# Patient Record
Sex: FEMALE | Race: BLACK OR AFRICAN AMERICAN | Employment: OTHER | URBAN - METROPOLITAN AREA
[De-identification: names, ages, dates, MRNs, and addresses within clinical notes are randomized per-mention and may not be internally consistent; named-entity substitution may affect disease eponyms.]

---

## 2017-06-27 ENCOUNTER — OFFICE VISIT (OUTPATIENT)
Dept: ORTHOPEDIC SURGERY | Age: 82
End: 2017-06-27

## 2017-06-27 VITALS
TEMPERATURE: 97.4 F | SYSTOLIC BLOOD PRESSURE: 133 MMHG | HEART RATE: 87 BPM | BODY MASS INDEX: 30.83 KG/M2 | RESPIRATION RATE: 14 BRPM | WEIGHT: 174 LBS | DIASTOLIC BLOOD PRESSURE: 71 MMHG | HEIGHT: 63 IN

## 2017-06-27 DIAGNOSIS — M54.50 PAIN OF LUMBAR SPINE: Primary | ICD-10-CM

## 2017-06-27 DIAGNOSIS — M46.1 SACROILIITIS (HCC): ICD-10-CM

## 2017-06-27 RX ORDER — OMEPRAZOLE 20 MG/1
20 CAPSULE, DELAYED RELEASE ORAL
COMMUNITY

## 2017-06-27 RX ORDER — COLCHICINE 0.6 MG/1
0.6 TABLET ORAL
COMMUNITY

## 2017-06-27 RX ORDER — MULTIVITAMIN
1 TABLET ORAL 2 TIMES DAILY
COMMUNITY

## 2017-06-27 RX ORDER — SIMVASTATIN 20 MG/1
TABLET, FILM COATED ORAL
COMMUNITY

## 2017-06-27 RX ORDER — ALLOPURINOL 100 MG/1
TABLET ORAL DAILY
COMMUNITY

## 2017-06-27 RX ORDER — CHOLECALCIFEROL (VITAMIN D3) 125 MCG
CAPSULE ORAL
COMMUNITY

## 2017-06-27 RX ORDER — GLIMEPIRIDE 2 MG/1
TABLET ORAL
COMMUNITY

## 2017-06-27 RX ORDER — AMLODIPINE BESYLATE 5 MG/1
5 TABLET ORAL DAILY
COMMUNITY

## 2017-06-27 RX ORDER — LISINOPRIL 20 MG/1
TABLET ORAL DAILY
COMMUNITY

## 2017-06-27 NOTE — MR AVS SNAPSHOT
Visit Information Date & Time Provider Department Dept. Phone Encounter #  
 6/27/2017 11:00 AM Leslie Allan MD [address] [facility] [phone] [id number] Follow-up Instructions Return in about 3 weeks (around 7/18/2017), or if symptoms worsen or fail to improve. Routing History Your Appointments 8/8/2017 11:15 AM  
Follow Up with Leslie Allan MD  
[address] [facility] [facility]) Appt Note: 3WK BLOCK FU; Carlos Amis 7/18/17  
 [address] [facility] [address] [phone]  
  
   
 [address] [facility] [address] Upcoming Health Maintenance Date Due DTaP/Tdap/Td series (1 - Tdap) 8/20/1950 ZOSTER VACCINE AGE 60> 8/20/1989 GLAUCOMA SCREENING Q2Y 8/20/1994 OSTEOPOROSIS SCREENING (DEXA) 8/20/1994 Pneumococcal 65+ Low/Medium Risk (1 of 2 - PCV13) 8/20/1994 MEDICARE YEARLY EXAM 8/20/1994 INFLUENZA AGE 9 TO ADULT 8/1/2017 Allergies as of 6/27/2017  Review Complete On: 6/27/2017 By: Winsome Pineda LPN Severity Noted Reaction Type Reactions Aspirin Medium 01/23/2013   Systemic Shortness of Breath Current Immunizations  Never Reviewed No immunizations on file. Not reviewed this visit You Were Diagnosed With   
  
 Codes Comments Pain of lumbar spine    -  Primary ICD-10-CM: M54.5 ICD-9-CM: 724.2 Sacroiliitis ([facility] [address]     ICD-10-CM: M46.1 ICD-9-CM: 720.2 Vitals BP Pulse Temp Resp Height(growth percentile) Weight(growth percentile) 133/71 87 97.4 °F (36.3 °C) (Oral) 14 5' 3\" (1.6 m) 174 lb (78.9 kg) BMI Smoking Status 30.82 kg/m2 Never Smoker BMI and BSA Data Body Mass Index Body Surface Area  
 30.82 kg/m 2 1.87 m 2 Preferred Pharmacy Pharmacy Name Phone  [address], [address] [facility],[address] Your Updated Medication List  
  
   
This list is accurate as of: 6/27/17 11:53 AM.  Always use your most recent med list.  
  
  
  
  
 allopurinol 100 mg tablet Commonly known as:  Cleatus Comp Take  by mouth daily. amLODIPine 5 mg tablet Commonly known as:  Barabara Puls Take 5 mg by mouth daily. calcium-cholecalciferol (D3) tablet Commonly known as:  CALTRATE 600+D Take 1 Tab by mouth two (2) times a day. colchicine 0.6 mg tablet Take 0.6 mg by mouth daily as needed. glimepiride 2 mg tablet Commonly known as:  AMARYL Take  by mouth every morning. HYDROCHLOROTHIAZIDE PO Take  by mouth daily. lisinopril 20 mg tablet Commonly known as:  Khang Leys Take  by mouth daily. omeprazole 20 mg capsule Commonly known as:  PRILOSEC Take 20 mg by mouth daily as needed. simvastatin 20 mg tablet Commonly known as:  ZOCOR Take  by mouth nightly. VITAMIN D3 2,000 unit Tab Generic drug:  cholecalciferol (vitamin D3) Take  by mouth. We Performed the Following AMB POC XRAY, SPINE, LUMBOSACRAL; 4+ [id number] CPT(R)] Follow-up Instructions Return in about 3 weeks (around 7/18/2017), or if symptoms worsen or fail to improve. Patient Instructions [employer] Activation Thank you for requesting access to [employer]. Please follow the instructions below to securely access and download your online medical record. [employer] allows you to send messages to your doctor, view your test results, renew your prescriptions, schedule appointments, and more. How Do I Sign Up? 1. In your internet browser, go to [email or web address] 
2. Click on the First Time User? Click Here link in the Sign In box. You will be redirect to the New Member Sign Up page. 3. Enter your [employer] Access Code exactly as it appears below.  You will not need to use this code after youve completed the sign-up process. If you do not sign up before the expiration date, you must request a new code. [employer] Access Code: [email or web address] Expires: 2017 10:03 AM (This is the date your [employer] access code will ) 4. Enter the last four digits of your Social Security Number (xxxx) and Date of Birth (mm/dd/yyyy) as indicated and click Submit. You will be taken to the next sign-up page. 5. Create a [employer] ID. This will be your [employer] login ID and cannot be changed, so think of one that is secure and easy to remember. 6. Create a [employer] password. You can change your password at any time. 7. Enter your Password Reset Question and Answer. This can be used at a later time if you forget your password. 8. Enter your e-mail address. You will receive e-mail notification when new information is available in [address] 9. Click Sign Up. You can now view and download portions of your medical record. 10. Click the Download Summary menu link to download a portable copy of your medical information. Additional Information If you have questions, please visit the Frequently Asked Questions section of the [employer] website at [email or web address] Remember, [employer] is NOT to be used for urgent needs. For medical emergencies, dial 911. Sacroiliac Pain: Exercises Your Care Instructions Here are some examples of typical rehabilitation exercises for your condition. Start each exercise slowly. Ease off the exercise if you start to have pain. Your doctor or physical therapist will tell you when you can start these exercises and which ones will work best for you. How to do the exercises Knee-to-chest stretch Do not do the knee-to-chest exercise if it causes or increases back or leg pain. 1. Lie on your back with your knees bent and your feet flat on the floor.  You can put a small pillow under your head and neck if it is more comfortable. 2. Grasp your hands under one knee and bring the knee to your chest, keeping the other foot flat on the floor. 3. Keep your lower back pressed to the floor. Hold for at least 15 to 30 seconds. 4. Relax and lower the knee to the starting position. Repeat with the other leg. 5. Repeat 2 to 4 times with each leg. 6. To get more stretch, keep your other leg flat on the floor while pulling your knee to your chest. 
Bridging 1. Lie on your back with both knees bent. Your knees should be bent about 90 degrees. 2. Tighten your belly muscles by pulling in your belly button toward your spine. Then push your feet into the floor, squeeze your buttocks, and lift your hips off the floor until your shoulders, hips, and knees are all in a straight line. 3. Hold for about 6 seconds as you continue to breathe normally, and then slowly lower your hips back down to the floor and rest for up to 10 seconds. 4. Repeat 8 to 12 times. Hip extension 1. Get down on your hands and knees on the floor. 2. Keeping your back and neck straight, lift one leg straight out behind you. When you lift your leg, keep your hips level. Don't let your back twist, and don't let your hip drop toward the floor. 3. Hold for 6 seconds. Repeat 8 to 12 times with each leg. 4. If you feel steady and strong when you do this exercise, you can make it more difficult. To do this, when you lift your leg, also lift the opposite arm straight out in front of you. For example, lift the left leg and the right arm at the same time. (This is sometimes called the \"bird dog exercise. \") Hold for 6 seconds, and repeat 8 to 12 times on each side. Clamshell 1. Lie on your side with a pillow under your head. Keep your feet and knees together and your knees bent. 2. Raise your top knee, but keep your feet together. Do not let your hips roll back. Your legs should open up like a clamshell. 3. Hold for 6 seconds. 4. Slowly lower your knee back down. Rest for 10 seconds. 5. Repeat 8 to 12 times. 6. Switch to your other side and repeat steps 1 through 5. Hamstring wall stretch 1. Lie on your back in a doorway, with one leg through the open door. 2. Slide your affected leg up the wall to straighten your knee. You should feel a gentle stretch down the back of your leg. ¨ Do not arch your back. ¨ Do not bend either knee. ¨ Keep one heel touching the floor and the other heel touching the wall. Do not point your toes. 3. Hold the stretch for at least 1 minute to begin. Then try to lengthen the time you hold the stretch to as long as 6 minutes. 4. Switch legs, and repeat steps 1 through 3. 
5. Repeat 2 to 4 times. If you do not have a place to do this exercise in a doorway, there is another way to do it: 1. Lie on your back, and bend one knee. 2. Loop a towel under the ball and toes of that foot, and hold the ends of the towel in your hands. 3. Straighten your knee, and slowly pull back on the towel. You should feel a gentle stretch down the back of your leg. 4. Switch legs, and repeat steps 1 through 3. 
5. Repeat 2 to 4 times. Lower abdominal strengthening 1. Lie on your back with your knees bent and your feet flat on the floor. 2. Tighten your belly muscles by pulling your belly button in toward your spine. 3. Lift one foot off the floor and bring your knee toward your chest, so that your knee is straight above your hip and your leg is bent like the letter \"L. \" 
4. Lift the other knee up to the same position. 5. Lower one leg at a time to the starting position. 6. Keep alternating legs until you have lifted each leg 8 to 12 times. 7. Be sure to keep your belly muscles tight and your back still as you are moving your legs. Be sure to breathe normally. Piriformis stretch 1. Lie on your back with your legs straight. 2. Lift your affected leg, and bend your knee.  With your opposite hand, reach across your body, and then gently pull your knee toward your opposite shoulder. 3. Hold the stretch for 15 to 30 seconds. 4. Switch legs and repeat steps 1 through 3. 
5. Repeat 2 to 4 times. Follow-up care is a key part of your treatment and safety. Be sure to make and go to all appointments, and call your doctor if you are having problems. It's also a good idea to know your test results and keep a list of the medicines you take. Where can you learn more? Go to http://dominik-tereza.info/. Enter A089 in the search box to learn more about \"Sacroiliac Pain: Exercises. \" Current as of: March 21, 2017 Content Version: 11.3 © 3340-3496 [employer]. Care instructions adapted under license by [employer] (which disclaims liability or warranty for this information). If you have questions about a medical condition or this instruction, always ask your healthcare professional. [address] any warranty or liability for your use of this information. Introducing [facility]! Vianney Cleverly introduces BrightContext patient portal. Now you can access parts of your medical record, email your doctor's office, and request medication refills online. 1. In your internet browser, go to [email or web address] 2. Click on the First Time User? Click Here link in the Sign In box. You will see the New Member Sign Up page. 3. Enter your [employer] Access Code exactly as it appears below. You will not need to use this code after youve completed the sign-up process. If you do not sign up before the expiration date, you must request a new code. · [employer] Access Code: [email or web address] Expires: 9/25/2017 10:03 AM 
 
4. Enter the last four digits of your Social Security Number (xxxx) and Date of Birth (mm/dd/yyyy) as indicated and click Submit. You will be taken to the next sign-up page. 5. Create a nvite ID. This will be your nvite login ID and cannot be changed, so think of one that is secure and easy to remember. 6. Create a nvite password. You can change your password at any time. 7. Enter your Password Reset Question and Answer. This can be used at a later time if you forget your password. 8. Enter your e-mail address. You will receive e-mail notification when new information is available in [address] 9. Click Sign Up. You can now view and download portions of your medical record. 10. Click the Download Summary menu link to download a portable copy of your medical information. If you have questions, please visit the Frequently Asked Questions section of the nvite website. Remember, nvite is NOT to be used for urgent needs. For medical emergencies, dial 911. Now available from your iPhone and Android! Please provide this summary of care documentation to your next provider. Your primary care clinician is listed as Abdi Pradhan. If you have any questions after today's visit, please call [phone].

## 2017-06-27 NOTE — PATIENT INSTRUCTIONS
[employer] Activation    Thank you for requesting access to [employer]. Please follow the instructions below to securely access and download your online medical record. [employer] allows you to send messages to your doctor, view your test results, renew your prescriptions, schedule appointments, and more. How Do I Sign Up? 1. In your internet browser, go to [email or web address]  2. Click on the First Time User? Click Here link in the Sign In box. You will be redirect to the New Member Sign Up page. 3. Enter your [employer] Access Code exactly as it appears below. You will not need to use this code after youve completed the sign-up process. If you do not sign up before the expiration date, you must request a new code. [employer] Access Code: [email or web address]  Expires: 2017 10:03 AM (This is the date your [employer] access code will )    4. Enter the last four digits of your Social Security Number (xxxx) and Date of Birth (mm/dd/yyyy) as indicated and click Submit. You will be taken to the next sign-up page. 5. Create a [employer] ID. This will be your [employer] login ID and cannot be changed, so think of one that is secure and easy to remember. 6. Create a [employer] password. You can change your password at any time. 7. Enter your Password Reset Question and Answer. This can be used at a later time if you forget your password. 8. Enter your e-mail address. You will receive e-mail notification when new information is available in [address] 9. Click Sign Up. You can now view and download portions of your medical record. 10. Click the Download Summary menu link to download a portable copy of your medical information. Additional Information    If you have questions, please visit the Frequently Asked Questions section of the [employer] website at [email or web address]; to (do) [email or web address] Remember, [employer] is NOT to be used for urgent needs. For medical emergencies, dial 911.          Sacroiliac Pain: Exercises  Your Care Instructions  Here are some examples of typical rehabilitation exercises for your condition. Start each exercise slowly. Ease off the exercise if you start to have pain. Your doctor or physical therapist will tell you when you can start these exercises and which ones will work best for you. How to do the exercises  Knee-to-chest stretch    Do not do the knee-to-chest exercise if it causes or increases back or leg pain. 1. Lie on your back with your knees bent and your feet flat on the floor. You can put a small pillow under your head and neck if it is more comfortable. 2. Grasp your hands under one knee and bring the knee to your chest, keeping the other foot flat on the floor. 3. Keep your lower back pressed to the floor. Hold for at least 15 to 30 seconds. 4. Relax and lower the knee to the starting position. Repeat with the other leg. 5. Repeat 2 to 4 times with each leg. 6. To get more stretch, keep your other leg flat on the floor while pulling your knee to your chest.  Bridging    1. Lie on your back with both knees bent. Your knees should be bent about 90 degrees. 2. Tighten your belly muscles by pulling in your belly button toward your spine. Then push your feet into the floor, squeeze your buttocks, and lift your hips off the floor until your shoulders, hips, and knees are all in a straight line. 3. Hold for about 6 seconds as you continue to breathe normally, and then slowly lower your hips back down to the floor and rest for up to 10 seconds. 4. Repeat 8 to 12 times. Hip extension    1. Get down on your hands and knees on the floor. 2. Keeping your back and neck straight, lift one leg straight out behind you. When you lift your leg, keep your hips level. Don't let your back twist, and don't let your hip drop toward the floor. 3. Hold for 6 seconds. Repeat 8 to 12 times with each leg. 4. If you feel steady and strong when you do this exercise, you can make it more difficult.  To do this, when you lift your leg, also lift the opposite arm straight out in front of you. For example, lift the left leg and the right arm at the same time. (This is sometimes called the \"bird dog exercise. \") Hold for 6 seconds, and repeat 8 to 12 times on each side. Clamshell    1. Lie on your side with a pillow under your head. Keep your feet and knees together and your knees bent. 2. Raise your top knee, but keep your feet together. Do not let your hips roll back. Your legs should open up like a clamshell. 3. Hold for 6 seconds. 4. Slowly lower your knee back down. Rest for 10 seconds. 5. Repeat 8 to 12 times. 6. Switch to your other side and repeat steps 1 through 5. Hamstring wall stretch    1. Lie on your back in a doorway, with one leg through the open door. 2. Slide your affected leg up the wall to straighten your knee. You should feel a gentle stretch down the back of your leg. ¨ Do not arch your back. ¨ Do not bend either knee. ¨ Keep one heel touching the floor and the other heel touching the wall. Do not point your toes. 3. Hold the stretch for at least 1 minute to begin. Then try to lengthen the time you hold the stretch to as long as 6 minutes. 4. Switch legs, and repeat steps 1 through 3.  5. Repeat 2 to 4 times. If you do not have a place to do this exercise in a doorway, there is another way to do it:  1. Lie on your back, and bend one knee. 2. Loop a towel under the ball and toes of that foot, and hold the ends of the towel in your hands. 3. Straighten your knee, and slowly pull back on the towel. You should feel a gentle stretch down the back of your leg. 4. Switch legs, and repeat steps 1 through 3.  5. Repeat 2 to 4 times. Lower abdominal strengthening    1. Lie on your back with your knees bent and your feet flat on the floor. 2. Tighten your belly muscles by pulling your belly button in toward your spine.   3. Lift one foot off the floor and bring your knee toward your chest, so that your knee is straight above your hip and your leg is bent like the letter \"L. \"  4. Lift the other knee up to the same position. 5. Lower one leg at a time to the starting position. 6. Keep alternating legs until you have lifted each leg 8 to 12 times. 7. Be sure to keep your belly muscles tight and your back still as you are moving your legs. Be sure to breathe normally. Piriformis stretch    1. Lie on your back with your legs straight. 2. Lift your affected leg, and bend your knee. With your opposite hand, reach across your body, and then gently pull your knee toward your opposite shoulder. 3. Hold the stretch for 15 to 30 seconds. 4. Switch legs and repeat steps 1 through 3.  5. Repeat 2 to 4 times. Follow-up care is a key part of your treatment and safety. Be sure to make and go to all appointments, and call your doctor if you are having problems. It's also a good idea to know your test results and keep a list of the medicines you take. Where can you learn more? Go to http://dominik-tereza.info/. Enter E502 in the search box to learn more about \"Sacroiliac Pain: Exercises. \"  Current as of: March 21, 2017  Content Version: 11.3  © 4666-9792 [employer]. Care instructions adapted under license by [employer] (which disclaims liability or warranty for this information). If you have questions about a medical condition or this instruction, always ask your healthcare professional. [place] [address] any warranty or liability for your use of this information.

## 2017-06-27 NOTE — PROGRESS NOTES
Fabian Youngula [facility] [address].  [address], [address]  [place], [address] [place] [address] Street  Phone: [phone]  Fax: [phone]        Akira Ritchie  : 1929  PCP: Benigno Silva MD  2017    NEW PATIENT      ASSESSMENT AND PLAN     Rivera Soto comes in to the office today c/o right-sided lumbar pain that is radiating into the lateral aspect of the right thigh. Based upon the physical examination findings of a positive right straight leg raise, right AMERICA test, and distraction test, I am lead to believe her symptoms are caused by sacroiliitis. She was referred for physical therapy and a right SI joint injection. Pt will f/u in 6 weeks or sooner if needed. Patrick Ortiz was seen today for back pain. Diagnoses and all orders for this visit:    Pain of lumbar spine  -     Cancel: [14332] L/S 2-3 views  -     [64325] LS Spine 4V    Sacroiliitis ([facility] [address]  -     REFERRAL TO PHYSICAL THERAPY  -     SCHEDULE SURGERY         Follow-up Disposition:  Return in about 3 weeks (around 2017), or if symptoms worsen or fail to improve. CHIEF COMPLAINT  Rivera Soto is seen today in consultation at the request of Benigno Silva MD for complaints of pain in the lumbar region. HISTORY OF PRESENT ILLNESS  Rivera Soto is a 80 y.o. female c/o constant right-sided pain in the lumbar region that radiates into the lateral aspect of the right thigh for the last year. She reports consistent balance issues. She denies any trauma to her back. She denies weakness, numbness, and bladder issues. Bending forward and sitting for an extended amount of time increases her pain. Heat helps relieve her pain. She is currently taking Tylenol for her pain. Pt denies any fevers, chills, nausea, vomiting. Pt denies any chest pain and shortness of breath. Pt denies any ear, nose, and throat problems. Pt denies any fecal or urinary incontinence. She is currently not working. PAST MEDICAL HISTORY   Past Medical History:   Diagnosis Date    Arthritis     Chronic kidney disease     only has 1/2 left kidney    Diabetes ([address]     GERD (gastroesophageal reflux disease)     Gout     High cholesterol     Hypertension        Past Surgical History:   Procedure Laterality Date    HX UROLOGICAL      Kidney left        MEDICATIONS      Current Outpatient Prescriptions   Medication Sig Dispense Refill    glimepiride (AMARYL) 2 mg tablet Take  by mouth every morning.  amLODIPine (NORVASC) 5 mg tablet Take 5 mg by mouth daily.  lisinopril (PRINIVIL, ZESTRIL) 20 mg tablet Take  by mouth daily.  simvastatin (ZOCOR) 20 mg tablet Take  by mouth nightly.  allopurinol (ZYLOPRIM) 100 mg tablet Take  by mouth daily.  calcium-cholecalciferol, D3, (CALTRATE 600+D) tablet Take 1 Tab by mouth two (2) times a day.  cholecalciferol, vitamin D3, (VITAMIN D3) 2,000 unit tab Take  by mouth.  colchicine 0.6 mg tablet Take 0.6 mg by mouth daily as needed.  omeprazole (PRILOSEC) 20 mg capsule Take 20 mg by mouth daily as needed.  HYDROCHLOROTHIAZIDE PO Take  by mouth daily. ALLERGIES    Allergies   Allergen Reactions    Aspirin Shortness of Breath          SOCIAL HISTORY    Social History     Social History    Marital status:      Spouse name: N/A    Number of children: N/A    Years of education: N/A     Social History Main Topics    Smoking status: Never Smoker    Smokeless tobacco: Never Used    Alcohol use No    Drug use: No    Sexual activity: Not Asked     Other Topics Concern    None     Social History Narrative    None       FAMILY HISTORY  History reviewed. No pertinent family history. REVIEW OF SYSTEMS  Review of Systems   Constitutional: Negative for chills, diaphoresis, fever, malaise/fatigue and weight loss. Respiratory: Negative for shortness of breath. Cardiovascular: Negative for chest pain and leg swelling. Gastrointestinal: Negative for constipation, nausea and vomiting. Neurological: Negative for dizziness, tingling, seizures, loss of consciousness, weakness and headaches. Psychiatric/Behavioral: The patient does not have insomnia. PHYSICAL EXAMINATION  Visit Vitals    /71    Pulse 87    Temp 97.4 °F (36.3 °C) (Oral)    Resp 14    Ht 5' 3\" (1.6 m)    Wt 174 lb (78.9 kg)    BMI 30.82 kg/m2         Pain Assessment  6/27/2017   Location of Pain Back;Leg   Location Modifiers Right   Severity of Pain 8   Quality of Pain Dull;Aching   Frequency of Pain Constant   Result of Injury No         Constitutional:  Well developed, well nourished, in no acute distress. Psychiatric: Affect and mood are appropriate. HEENT: Normocephalic, atraumatic. Extraocular movements intact. Integumentary: No rashes or abrasions noted on exposed areas. Cardiovascular: Regular rate and rhythm. Pulmonary: Clear to auscultation bilaterally. SPINE/MUSCULOSKELETAL EXAM    Cervical spine:  Neck is midline. Normal muscle tone. No focal atrophy is noted. ROM pain free. Shoulder ROM intact. No tenderness to palpation. Negative Spurling's sign. Negative Tinel's sign. Negative Peralta's sign. Sensation in the bilateral arms grossly intact to light touch. Lumbar spine:  No rash, ecchymosis, or gross obliquity. No fasciculations. No focal atrophy is noted. No pain with hip ROM. Full range of motion. Tenderness to palpation. No tenderness to palpation at the sciatic notch. Right SI joint tender. Trochanters non tender. Positive right AMERICA test   Negative P4 test   Negative Compression test  Positive right Distraction test   Negative Gaenslen's test       Sensation in the bilateral legs grossly intact to light touch.       MOTOR:      Biceps  Triceps Deltoids Wrist Ext Wrist Flex Hand Intrin   Right 5/5 5/5 5/5 5/5 5/5 5/5   Left 5/5 5/5 5/5 5/5 5/5 5/5             Hip Flex  Quads Hamstrings Ankle DF EHL Ankle PF   Right 5/5 5/5 5/5 5/5 5/5 5/5   Left 5/5 5/5 5/5 5/5 5/5 5/5     DTRs are 2+ biceps, triceps, brachioradialis, patella, and Achilles. Positive right Straight Leg raise. Squat not tested. No difficulty with tandem gait. Ambulation without assistive device. FWB. RADIOGRAPHS  Lumbar XR images taken on 06/27/2017 personally reviewed with patient:  1) Stable L4-5 anterolisthesis and severe disc space narrowing  2) No obvious fractures     reviewed    Ms. Nikos Ivy has a reminder for a \"due or due soon\" health maintenance. I have asked that she contact her primary care provider for follow-up on this health maintenance. This plan was reviewed with the patient and patient agrees. All questions were answered. More than half of this visit today was spent on counseling. Written by Roslyn Sharma, as dictated by Dr. Maurilio Gamboa. I, Dr. Maurilio Gamboa, confirm that all documentation is accurate.

## 2017-07-18 ENCOUNTER — HOSPITAL ENCOUNTER (OUTPATIENT)
Age: 82
Setting detail: OUTPATIENT SURGERY
Discharge: HOME | End: 2017-07-18
Attending: PHYSICAL MEDICINE & REHABILITATION | Admitting: PHYSICAL MEDICINE & REHABILITATION
Payer: MEDICARE

## 2017-07-18 ENCOUNTER — APPOINTMENT (OUTPATIENT)
Dept: GENERAL RADIOLOGY | Age: 82
End: 2017-07-18
Attending: PHYSICAL MEDICINE & REHABILITATION
Payer: MEDICARE

## 2017-07-18 VITALS
DIASTOLIC BLOOD PRESSURE: 74 MMHG | RESPIRATION RATE: 18 BRPM | SYSTOLIC BLOOD PRESSURE: 139 MMHG | TEMPERATURE: 97.9 F | BODY MASS INDEX: 30.83 KG/M2 | WEIGHT: 174 LBS | HEART RATE: 79 BPM | OXYGEN SATURATION: 94 % | HEIGHT: 63 IN

## 2017-07-18 LAB — GLUCOSE BLD STRIP.AUTO-MCNC: 70 MG/DL (ref 70–110)

## 2017-07-18 PROCEDURE — 74011250636 HC RX REV CODE- 250/636

## 2017-07-18 PROCEDURE — 74011250637 HC RX REV CODE- 250/637: Performed by: PHYSICAL MEDICINE & REHABILITATION

## 2017-07-18 PROCEDURE — 82962 GLUCOSE BLOOD TEST: CPT

## 2017-07-18 PROCEDURE — 74011636320 HC RX REV CODE- 636/320

## 2017-07-18 PROCEDURE — 74011000250 HC RX REV CODE- 250

## 2017-07-18 PROCEDURE — 76010000009 HC PAIN MGT 0 TO 30 MIN PROC: Performed by: PHYSICAL MEDICINE & REHABILITATION

## 2017-07-18 RX ORDER — LIDOCAINE HYDROCHLORIDE 10 MG/ML
INJECTION, SOLUTION EPIDURAL; INFILTRATION; INTRACAUDAL; PERINEURAL AS NEEDED
Status: DISCONTINUED | OUTPATIENT
Start: 2017-07-18 | End: 2017-07-18 | Stop reason: HOSPADM

## 2017-07-18 RX ORDER — DIAZEPAM 5 MG/1
2.5-1 TABLET ORAL ONCE
Status: COMPLETED | OUTPATIENT
Start: 2017-07-18 | End: 2017-07-18

## 2017-07-18 RX ORDER — DEXAMETHASONE SODIUM PHOSPHATE 100 MG/10ML
INJECTION INTRAMUSCULAR; INTRAVENOUS AS NEEDED
Status: DISCONTINUED | OUTPATIENT
Start: 2017-07-18 | End: 2017-07-18 | Stop reason: HOSPADM

## 2017-07-18 RX ADMIN — DIAZEPAM 5 MG: 5 TABLET ORAL at 15:18

## 2017-07-18 NOTE — H&P (VIEW-ONLY)
Fabian Amaro [facility] [address].  [address], [address]  [place], [address] [place] [address] Street  Phone: [phone]  Fax: [phone]        Mouna Cochran  : 1929  PCP: Tanna Hutchinson MD  2017    NEW PATIENT      ASSESSMENT AND PLAN     Barbara Bueno comes in to the office today c/o right-sided lumbar pain that is radiating into the lateral aspect of the right thigh. Based upon the physical examination findings of a positive right straight leg raise, right AMERICA test, and distraction test, I am lead to believe her symptoms are caused by sacroiliitis. She was referred for physical therapy and a right SI joint injection. Pt will f/u in 6 weeks or sooner if needed. Sundar Chung was seen today for back pain. Diagnoses and all orders for this visit:    Pain of lumbar spine  -     Cancel: [52544] L/S 2-3 views  -     [26407] LS Spine 4V    Sacroiliitis ([facility] [address]  -     REFERRAL TO PHYSICAL THERAPY  -     SCHEDULE SURGERY         Follow-up Disposition:  Return in about 3 weeks (around 2017), or if symptoms worsen or fail to improve. CHIEF COMPLAINT  Barbara Bueno is seen today in consultation at the request of Tanna Hutchinson MD for complaints of pain in the lumbar region. HISTORY OF PRESENT ILLNESS  Barbara Bueno is a 80 y.o. female c/o constant right-sided pain in the lumbar region that radiates into the lateral aspect of the right thigh for the last year. She reports consistent balance issues. She denies any trauma to her back. She denies weakness, numbness, and bladder issues. Bending forward and sitting for an extended amount of time increases her pain. Heat helps relieve her pain. She is currently taking Tylenol for her pain. Pt denies any fevers, chills, nausea, vomiting. Pt denies any chest pain and shortness of breath. Pt denies any ear, nose, and throat problems. Pt denies any fecal or urinary incontinence. She is currently not working. PAST MEDICAL HISTORY   Past Medical History:   Diagnosis Date    Arthritis     Chronic kidney disease     only has 1/2 left kidney    Diabetes ([address]     GERD (gastroesophageal reflux disease)     Gout     High cholesterol     Hypertension        Past Surgical History:   Procedure Laterality Date    HX UROLOGICAL      Kidney left        MEDICATIONS      Current Outpatient Prescriptions   Medication Sig Dispense Refill    glimepiride (AMARYL) 2 mg tablet Take  by mouth every morning.  amLODIPine (NORVASC) 5 mg tablet Take 5 mg by mouth daily.  lisinopril (PRINIVIL, ZESTRIL) 20 mg tablet Take  by mouth daily.  simvastatin (ZOCOR) 20 mg tablet Take  by mouth nightly.  allopurinol (ZYLOPRIM) 100 mg tablet Take  by mouth daily.  calcium-cholecalciferol, D3, (CALTRATE 600+D) tablet Take 1 Tab by mouth two (2) times a day.  cholecalciferol, vitamin D3, (VITAMIN D3) 2,000 unit tab Take  by mouth.  colchicine 0.6 mg tablet Take 0.6 mg by mouth daily as needed.  omeprazole (PRILOSEC) 20 mg capsule Take 20 mg by mouth daily as needed.  HYDROCHLOROTHIAZIDE PO Take  by mouth daily. ALLERGIES    Allergies   Allergen Reactions    Aspirin Shortness of Breath          SOCIAL HISTORY    Social History     Social History    Marital status:      Spouse name: N/A    Number of children: N/A    Years of education: N/A     Social History Main Topics    Smoking status: Never Smoker    Smokeless tobacco: Never Used    Alcohol use No    Drug use: No    Sexual activity: Not Asked     Other Topics Concern    None     Social History Narrative    None       FAMILY HISTORY  History reviewed. No pertinent family history. REVIEW OF SYSTEMS  Review of Systems   Constitutional: Negative for chills, diaphoresis, fever, malaise/fatigue and weight loss. Respiratory: Negative for shortness of breath. Cardiovascular: Negative for chest pain and leg swelling. Gastrointestinal: Negative for constipation, nausea and vomiting. Neurological: Negative for dizziness, tingling, seizures, loss of consciousness, weakness and headaches. Psychiatric/Behavioral: The patient does not have insomnia. PHYSICAL EXAMINATION  Visit Vitals    /71    Pulse 87    Temp 97.4 °F (36.3 °C) (Oral)    Resp 14    Ht 5' 3\" (1.6 m)    Wt 174 lb (78.9 kg)    BMI 30.82 kg/m2         Pain Assessment  6/27/2017   Location of Pain Back;Leg   Location Modifiers Right   Severity of Pain 8   Quality of Pain Dull;Aching   Frequency of Pain Constant   Result of Injury No         Constitutional:  Well developed, well nourished, in no acute distress. Psychiatric: Affect and mood are appropriate. HEENT: Normocephalic, atraumatic. Extraocular movements intact. Integumentary: No rashes or abrasions noted on exposed areas. Cardiovascular: Regular rate and rhythm. Pulmonary: Clear to auscultation bilaterally. SPINE/MUSCULOSKELETAL EXAM    Cervical spine:  Neck is midline. Normal muscle tone. No focal atrophy is noted. ROM pain free. Shoulder ROM intact. No tenderness to palpation. Negative Spurling's sign. Negative Tinel's sign. Negative Peralta's sign. Sensation in the bilateral arms grossly intact to light touch. Lumbar spine:  No rash, ecchymosis, or gross obliquity. No fasciculations. No focal atrophy is noted. No pain with hip ROM. Full range of motion. Tenderness to palpation. No tenderness to palpation at the sciatic notch. Right SI joint tender. Trochanters non tender. Positive right AMERICA test   Negative P4 test   Negative Compression test  Positive right Distraction test   Negative Gaenslen's test       Sensation in the bilateral legs grossly intact to light touch.       MOTOR:      Biceps  Triceps Deltoids Wrist Ext Wrist Flex Hand Intrin   Right 5/5 5/5 5/5 5/5 5/5 5/5   Left 5/5 5/5 5/5 5/5 5/5 5/5             Hip Flex  Quads Hamstrings Ankle DF EHL Ankle PF   Right 5/5 5/5 5/5 5/5 5/5 5/5   Left 5/5 5/5 5/5 5/5 5/5 5/5     DTRs are 2+ biceps, triceps, brachioradialis, patella, and Achilles. Positive right Straight Leg raise. Squat not tested. No difficulty with tandem gait. Ambulation without assistive device. FWB. RADIOGRAPHS  Lumbar XR images taken on 06/27/2017 personally reviewed with patient:  1) Stable L4-5 anterolisthesis and severe disc space narrowing  2) No obvious fractures     reviewed    Ms. Aloma Gilford has a reminder for a \"due or due soon\" health maintenance. I have asked that she contact her primary care provider for follow-up on this health maintenance. This plan was reviewed with the patient and patient agrees. All questions were answered. More than half of this visit today was spent on counseling. Written by Tamra Osman, as dictated by Dr. Jez Moncao. I, Dr. Jez Monaco, confirm that all documentation is accurate.

## 2017-07-18 NOTE — DISCHARGE INSTRUCTIONS
[facility]   ([facility])  Dr. Mari Saravia, Dr. Janette Samaniego, Dr. Shahzad Schreiber not drive a car, operate heavy machinery or dangerous equipment for 24 hours. * Activity as tolerated; rest for the remainder of the day. * Resume pre-block medications including those for your family doctor. * Do not drink alcoholic beverages for 24 hours. Alcohol and the medications you have received may interact and cause an adverse reaction. * You may feel better this evening and worse tomorrow, as the numbing medications wears off and the steroid has yet to begin to work. After 48 hrs the steroid should begin to release bringing you relief. * You may shower this evening and remove any bandages. * Avoid hot tubs and heating pads for 24 hours. You may use cold packs on the procedure site as tolerated for the first 24 hours. * If a headache develops, drink plenty of fluids and rest.  Take over the counter medications for headache if needed. If the headache continues longer than 24 hours, call MD at the [address] [place] [address]. [phone]    * Continue taking pain medications as needed. * You may resume your regular diet if tolerated. Otherwise, start with sips of water and advance slowly. * If Diabetic: check your blood sugar three times a day for the next 3 days. If your sugar is greater than 300 call your family doctor. If your sugar is greater than 400, have someone transport you to the nearest Emergency Room. * If you experience any of the following problems, Please Call the [address] [place] [address] at [phone].         * Shortness of Breath    * Fever of 101 or higher    * Nausea / Vomiting    * Severe Headache    * Weakness or numbness in arms or legs that is not      resolving    * Prolonged increase in pain greater than 4 days      DISCHARGE SUMMARY from Nurse      PATIENT INSTRUCTIONS:    After oral sedation, for 24 hours or while taking prescription Narcotics:  · Limit your activities  · Do not drive and operate hazardous machinery  · Do not make important personal or business decisions  · Do  not drink alcoholic beverages  · If you have not urinated within 8 hours after discharge, please contact your surgeon on call. Report the following to your surgeon:  · Excessive pain, swelling, redness or odor of or around the surgical area  · Temperature over 101  · Nausea and vomiting lasting longer than 4 hours or if unable to take medications  · Any signs of decreased circulation or nerve impairment to extremity: change in color, persistent  numbness, tingling, coldness or increase pain  · Any questions            What to do at Home:  Recommended activity: Activity as tolerated, NO DRIVING FOR 12 Hours post injection          *  Please give a list of your current medications to your Primary Care Provider. *  Please update this list whenever your medications are discontinued, doses are      changed, or new medications (including over-the-counter products) are added. *  Please carry medication information at all times in case of emergency situations. These are general instructions for a healthy lifestyle:    No smoking/ No tobacco products/ Avoid exposure to second hand smoke    Surgeon General's Warning:  Quitting smoking now greatly reduces serious risk to your health. Obesity, smoking, and sedentary lifestyle greatly increases your risk for illness    A healthy diet, regular physical exercise & weight monitoring are important for maintaining a healthy lifestyle    You may be retaining fluid if you have a history of heart failure or if you experience any of the following symptoms:  Weight gain of 3 pounds or more overnight or 5 pounds in a week, increased swelling in our hands or feet or shortness of breath while lying flat in bed.   Please call your doctor as soon as you notice any of these symptoms; do not wait until your next office visit. Recognize signs and symptoms of STROKE:    F-face looks uneven    A-arms unable to move or move unevenly    S-speech slurred or non-existent    T-time-call 911 as soon as signs and symptoms begin-DO NOT go       Back to bed or wait to see if you get better-TIME IS BRAIN.

## 2017-07-18 NOTE — PROCEDURES
Procedure Note    Patient Name: Jannet Ellis    Date of Procedure: July 18, 2017    Preoperative Diagnosis:Sacroiliitis    Post Operative Diagnosis: same    Procedure: SI Joint Injection, Intra-articular and extra-articular - Unilateral  right    Consent: Informed consent was obtained prior to the procedure. The patient was given the opportunity to ask questions regarding the procedure and its associated risks. In addition to the potential risks associated with the procedure itself, the patient was informed both verbally and in writing of potential side effects of the use of corticosteroids. The patient appeared to comprehend the informed consent and desired to have the procedure perfored. Procedure: The patient was placed in the prone position on the flouroscopy table and the back was prepped and draped in the usual sterile manner. After local Lidocaine 1% infiltration, a #22 gauge spinal needle was then advanced to lie within the Sacroiliac Joint. Yes a small amount of Isovue was used to confirm placement, no vascular uptake was identified. A total of 30 mg of Dexamethasone  and 5 ml of Lidocaine was introduced in and around the joint. The injection area was cleaned and bandaids applied. No excessive bleeding was noted. Patient dressed and was discharged to home with instructions. Discussion:  The patient tolerated the procedure well.         [address], MD  July 18, 2017

## 2017-08-08 ENCOUNTER — OFFICE VISIT (OUTPATIENT)
Dept: ORTHOPEDIC SURGERY | Age: 82
End: 2017-08-08

## 2017-08-08 VITALS
HEART RATE: 86 BPM | WEIGHT: 174 LBS | TEMPERATURE: 98.2 F | OXYGEN SATURATION: 95 % | SYSTOLIC BLOOD PRESSURE: 148 MMHG | DIASTOLIC BLOOD PRESSURE: 68 MMHG | RESPIRATION RATE: 18 BRPM | BODY MASS INDEX: 30.82 KG/M2

## 2017-08-08 DIAGNOSIS — M54.16 LUMBAR RADICULOPATHY: ICD-10-CM

## 2017-08-08 DIAGNOSIS — M54.16 LUMBAR RADICULOPATHY: Primary | ICD-10-CM

## 2017-08-08 RX ORDER — GABAPENTIN 300 MG/1
300 CAPSULE ORAL 3 TIMES DAILY
Qty: 90 CAP | Refills: 2 | Status: SHIPPED | OUTPATIENT
Start: 2017-08-08 | End: 2017-09-18 | Stop reason: DRUGHIGH

## 2017-08-08 RX ORDER — GABAPENTIN 300 MG/1
CAPSULE ORAL
Qty: 270 CAP | Refills: 2 | OUTPATIENT
Start: 2017-08-08

## 2017-08-08 NOTE — TELEPHONE ENCOUNTER
I do not see anywhere where Dr. Ernie Presley has ordered this before or that she has even been on it. Please check with him.

## 2017-08-08 NOTE — PATIENT INSTRUCTIONS
Gabapentin (By mouth)   Gabapentin (kyle-a-PEN-tin)  Treats seizures and pain caused by shingles. Brand Name(s): ACTIVE-PAC with Gabapentin, Convenience Ruddy, Cyclo/Faith 10/300 Pack, FusePaq Fanatrex, Faith-V, Ehingen, Neurontin, SmartRx Faith Kit   There may be other brand names for this medicine. When This Medicine Should Not Be Used: This medicine is not right for everyone. Do not use it if you had an allergic reaction to gabapentin. How to Use This Medicine:   Capsule, Liquid, Tablet  · Take your medicine as directed. Your dose may need to be changed several times to find what works best for you. If you have epilepsy, do not allow more than 12 hours to pass between doses. · Capsule: Swallow the capsule whole with plenty of water. Do not open, crush, or chew it. · Gralise® tablet: Swallow the tablet whole . Do not crush, break, or chew it. · Neurontin® tablet: If you break a tablet into 2 pieces, use the second half as your next dose. If you don't use it within 28 days, throw it away. · Measure the oral liquid medicine with a marked measuring spoon, oral syringe, or medicine cup. · This medicine should come with a Medication Guide. Ask your pharmacist for a copy if you do not have one. · Missed dose: Take a dose as soon as you remember. If it is almost time for your next dose, wait until then and take a regular dose. Do not take extra medicine to make up for a missed dose. · Store the medicine in a closed container at room temperature, away from heat, moisture, and direct light. Store the Neurontin® oral liquid in the refrigerator. Do not freeze. Drugs and Foods to Avoid:   Ask your doctor or pharmacist before using any other medicine, including over-the-counter medicines, vitamins, and herbal products. · Some medicines can affect how gabapentin works.  Tell your doctor if you also use any of the following:   ¨ Hydrocodone  ¨ Morphine  · If you take an antacid, wait at least 2 hours before you take gabapentin. · Tell your doctor if you use anything else that makes you sleepy. Some examples are allergy medicine, narcotic pain medicine, and alcohol. Warnings While Using This Medicine:   · Tell your doctor if you are pregnant or breastfeeding, or if you have kidney problems or are receiving dialysis. Tell your doctor if you have a history of depression or mental health problems. · This medicine may increase depression or thoughts of suicide. Tell your doctor right away if you start to feel more depressed or think about hurting yourself. · This medicine may cause a serious allergic reaction called multiorgan hypersensitivity, which can damage organs and be life-threatening. · Do not stop using this medicine suddenly. Your doctor will need to slowly decrease your dose before you stop it completely. If you take this medicine to prevent seizures, your seizures may return or occur more often if you stop this medicine suddenly. · This medicine may make you dizzy or drowsy. Do not drive or do anything else that could be dangerous until you know how this medicine affects you. · Tell any doctor or dentist who treats you that you are using this medicine. This medicine may affect certain medical test results. · Your doctor will check your progress and the effects of this medicine at regular visits. Keep all appointments. · Keep all medicine out of the reach of children. Never share your medicine with anyone.   Possible Side Effects While Using This Medicine:   Call your doctor right away if you notice any of these side effects:  · Allergic reaction: Itching or hives, swelling in your face or hands, swelling or tingling in your mouth or throat, chest tightness, trouble breathing  · Behavior problems, aggression, restlessness, trouble concentrating, moodiness (especially in children)  · Blistering, peeling, red skin rash  · Change in how much or how often you urinate, bloody or cloudy urine,  · Chest pain, fast heartbeat, trouble breathing  · Dark urine or pale stools, nausea, vomiting, loss of appetite, stomach pain, yellow skin or eyes  · Fever, rash, swollen or tender glands in the neck, armpit, or groin  · Problems with coordination, shakiness, unsteadiness  · Rapid weight gain, swelling in your hands, ankles, or feet  · Unusual moods or behaviors, thoughts of hurting yourself, feeling depressed  If you notice these less serious side effects, talk with your doctor:   · Dizziness, drowsiness, sleepiness, tiredness  If you notice other side effects that you think are caused by this medicine, tell your doctor. Call your doctor for medical advice about side effects. You may report side effects to FDA at 4-166-FDA-7589  © 2017 [address] Information is for End User's use only and may not be sold, redistributed or otherwise used for commercial purposes. The above information is an  only. It is not intended as medical advice for individual conditions or treatments. Talk to your doctor, nurse or pharmacist before following any medical regimen to see if it is safe and effective for you. Gabapentin (Neurontin) 300 mg three times a day (TID) daily instructions:       Morning Afternoon Night   Week 1 - - 1 pill   Week 2 1 pill - 1 pill   Week 3 and onwards 1 pill 1 pill 1 pill     Week 1: Take one pill each night. Week 2: Take one pill in the morning and one pill at night. Week 3 and onwards: Take one pill in the morning, one pill in the afternoon, and one pill at night. Continue taking this dose each day.

## 2017-08-08 NOTE — MR AVS SNAPSHOT
Visit Information Date & Time Provider Department Dept. Phone Encounter #  
 8/8/2017 11:15 AM Hermila Osman MD [facility] [phone] [id number] Follow-up Instructions Return in about 2 weeks (around 8/22/2017), or if symptoms worsen or fail to improve. Upcoming Health Maintenance Date Due DTaP/Tdap/Td series (1 - Tdap) 7/20/1950 ZOSTER VACCINE AGE 60> 5/20/1989 GLAUCOMA SCREENING Q2Y 7/20/1994 OSTEOPOROSIS SCREENING (DEXA) 7/20/1994 Pneumococcal 65+ Low/Medium Risk (1 of 2 - PCV13) 7/20/1994 MEDICARE YEARLY EXAM 7/20/1994 INFLUENZA AGE 9 TO ADULT 8/1/2017 Allergies as of 8/8/2017  Review Complete On: 8/8/2017 By: Obinna Gutierrez LPN Severity Noted Reaction Type Reactions Aspirin Medium 01/23/2013   Systemic Shortness of Breath Current Immunizations  Never Reviewed No immunizations on file. Not reviewed this visit You Were Diagnosed With   
  
 Codes Comments Lumbar radiculopathy    -  Primary ICD-10-CM: M54.16 
ICD-9-CM: 724.4 Vitals BP Pulse Temp Resp Weight(growth percentile) SpO2  
 148/68 86 98.2 °F (36.8 °C) 18 174 lb (78.9 kg) 95% BMI OB Status Smoking Status 30.82 kg/m2 Hysterectomy Never Smoker BMI and BSA Data Body Mass Index Body Surface Area  
 30.82 kg/m 2 1.87 m 2 Preferred Pharmacy Pharmacy Name Phone [facility] [address] [place] [address] [phone] Your Updated Medication List  
  
   
This list is accurate as of: 8/8/17 11:38 AM.  Always use your most recent med list.  
  
  
  
  
 allopurinol 100 mg tablet Commonly known as:  Orlando Huitron Take  by mouth daily. amLODIPine 5 mg tablet Commonly known as:  Dallin Kaur Take 5 mg by mouth daily. calcium-cholecalciferol (D3) tablet Commonly known as:  CALTRATE 600+D Take 1 Tab by mouth two (2) times a day. colchicine 0.6 mg tablet Take 0.6 mg by mouth daily as needed. gabapentin 300 mg capsule Commonly known as:  NEURONTIN Take 1 Cap by mouth three (3) times daily. glimepiride 2 mg tablet Commonly known as:  AMARYL Take  by mouth every morning. HYDROCHLOROTHIAZIDE PO Take  by mouth daily. lisinopril 20 mg tablet Commonly known as:  Karn Desanctis Take  by mouth daily. omeprazole 20 mg capsule Commonly known as:  PRILOSEC Take 20 mg by mouth daily as needed. simvastatin 20 mg tablet Commonly known as:  ZOCOR Take  by mouth nightly. VITAMIN D3 2,000 unit Tab Generic drug:  cholecalciferol (vitamin D3) Take  by mouth. Prescriptions Sent to Pharmacy Refills  
 gabapentin (NEURONTIN) 300 mg capsule 2 Sig: Take 1 Cap by mouth three (3) times daily. Class: Normal  
 Pharmacy: [facility] [address] [place] [address] #: [phone] Route: Oral  
  
Follow-up Instructions Return in about 2 weeks (around 8/22/2017), or if symptoms worsen or fail to improve. To-Do List   
 08/09/2017 10:45 AM  
  Appointment with [facility] MRI RM 1 at [facility] RAD MRI ([phone]) GENERAL INSTRUCTIONS  Bring information (ID card) if you have any medically implanted devices. You will be required to lie still while the procedure is being performed. Remove any jewelry (including body piercing, hairpins) prior to MRI. If you have had a creatinine level drawn within the past 30 days, please bring most recent results to your appt. Bring any films, CD's, and reports related to your study with you on the day of your exam.  This only includes studies done outside of [facility], [facility], [facility], and [facility].   Bring a complete list of all medications you are currently taking to include prescriptions, over-the-counter meds, herbals, vitamins & any dietary supplements. If you were given medications for claustrophobia or anxiety, please arrange to have someone drive you to your appointment. QUESTIONS  Notify the MRI Department if you have any questions concerning your study. [facility] [phone]  
  
 08/15/2017 Imaging:  MRI LUMB SPINE WO CONT Patient Instructions Gabapentin (By mouth) Gabapentin (kyle-a-PEN-tin) Treats seizures and pain caused by shingles. Brand Name(s): ACTIVE-PAC with Gabapentin, Convenience Ruddy, Cyclo/Faith 10/300 Pack, FusePaq Fanatrex, Faith-V, Gralise, Neurontin, [place] There may be other brand names for this medicine. When This Medicine Should Not Be Used: This medicine is not right for everyone. Do not use it if you had an allergic reaction to gabapentin. How to Use This Medicine:  
Capsule, Liquid, Tablet · Take your medicine as directed. Your dose may need to be changed several times to find what works best for you. If you have epilepsy, do not allow more than 12 hours to pass between doses. · Capsule: Swallow the capsule whole with plenty of water. Do not open, crush, or chew it. · Gralise® tablet: Swallow the tablet whole . Do not crush, break, or chew it. · Neurontin® tablet: If you break a tablet into 2 pieces, use the second half as your next dose. If you don't use it within 28 days, throw it away. · Measure the oral liquid medicine with a marked measuring spoon, oral syringe, or medicine cup. · This medicine should come with a Medication Guide. Ask your pharmacist for a copy if you do not have one. · Missed dose: Take a dose as soon as you remember. If it is almost time for your next dose, wait until then and take a regular dose. Do not take extra medicine to make up for a missed dose. · Store the medicine in a closed container at room temperature, away from heat, moisture, and direct light. Store the Neurontin® oral liquid in the refrigerator. Do not freeze. Drugs and Foods to Avoid: Ask your doctor or pharmacist before using any other medicine, including over-the-counter medicines, vitamins, and herbal products. · Some medicines can affect how gabapentin works. Tell your doctor if you also use any of the following: ¨ Hydrocodone ¨ Morphine · If you take an antacid, wait at least 2 hours before you take gabapentin. · Tell your doctor if you use anything else that makes you sleepy. Some examples are allergy medicine, narcotic pain medicine, and alcohol. Warnings While Using This Medicine: · Tell your doctor if you are pregnant or breastfeeding, or if you have kidney problems or are receiving dialysis. Tell your doctor if you have a history of depression or mental health problems. · This medicine may increase depression or thoughts of suicide. Tell your doctor right away if you start to feel more depressed or think about hurting yourself. · This medicine may cause a serious allergic reaction called multiorgan hypersensitivity, which can damage organs and be life-threatening. · Do not stop using this medicine suddenly. Your doctor will need to slowly decrease your dose before you stop it completely. If you take this medicine to prevent seizures, your seizures may return or occur more often if you stop this medicine suddenly. · This medicine may make you dizzy or drowsy. Do not drive or do anything else that could be dangerous until you know how this medicine affects you. · Tell any doctor or dentist who treats you that you are using this medicine. This medicine may affect certain medical test results. · Your doctor will check your progress and the effects of this medicine at regular visits. Keep all appointments. · Keep all medicine out of the reach of children.  Never share your medicine with anyone. Possible Side Effects While Using This Medicine:  
Call your doctor right away if you notice any of these side effects: · Allergic reaction: Itching or hives, swelling in your face or hands, swelling or tingling in your mouth or throat, chest tightness, trouble breathing · Behavior problems, aggression, restlessness, trouble concentrating, moodiness (especially in children) · Blistering, peeling, red skin rash · Change in how much or how often you urinate, bloody or cloudy urine, 
· Chest pain, fast heartbeat, trouble breathing · Dark urine or pale stools, nausea, vomiting, loss of appetite, stomach pain, yellow skin or eyes · Fever, rash, swollen or tender glands in the neck, armpit, or groin · Problems with coordination, shakiness, unsteadiness · Rapid weight gain, swelling in your hands, ankles, or feet · Unusual moods or behaviors, thoughts of hurting yourself, feeling depressed If you notice these less serious side effects, talk with your doctor: · Dizziness, drowsiness, sleepiness, tiredness If you notice other side effects that you think are caused by this medicine, tell your doctor. Call your doctor for medical advice about side effects. You may report side effects to FDA at 6-786-FDA-6430 © 2017 [facility] Information is for End User's use only and may not be sold, redistributed or otherwise used for commercial purposes. The above information is an  only. It is not intended as medical advice for individual conditions or treatments. Talk to your doctor, nurse or pharmacist before following any medical regimen to see if it is safe and effective for you. Gabapentin (Neurontin) 300 mg three times a day (TID) daily instructions: 
 
 
 Morning Afternoon Night Week 1 - - 1 pill Week 2 1 pill - 1 pill Week 3 and onwards 1 pill 1 pill 1 pill Week 1: Take one pill each night. Week 2: Take one pill in the morning and one pill at night. Week 3 and onwards: Take one pill in the morning, one pill in the afternoon, and one pill at night. Continue taking this dose each day. Introducing [facility]! Patel Brooks introduces Matchpoint patient portal. Now you can access parts of your medical record, email your doctor's office, and request medication refills online. 1. In your internet browser, go to [email or web address] 2. Click on the First Time User? Click Here link in the Sign In box. You will see the New Member Sign Up page. 3. Enter your Matchpoint Access Code exactly as it appears below. You will not need to use this code after youve completed the sign-up process. If you do not sign up before the expiration date, you must request a new code. · Matchpoint Access Code: [email or web address] Expires: 9/25/2017 10:03 AM 
 
4. Enter the last four digits of your Social Security Number (xxxx) and Date of Birth (mm/dd/yyyy) as indicated and click Submit. You will be taken to the next sign-up page. 5. Create a Matchpoint ID. This will be your Matchpoint login ID and cannot be changed, so think of one that is secure and easy to remember. 6. Create a Matchpoint password. You can change your password at any time. 7. Enter your Password Reset Question and Answer. This can be used at a later time if you forget your password. 8. Enter your e-mail address. You will receive e-mail notification when new information is available in [address] 9. Click Sign Up. You can now view and download portions of your medical record. 10. Click the Download Summary menu link to download a portable copy of your medical information. If you have questions, please visit the Frequently Asked Questions section of the Matchpoint website. Remember, Matchpoint is NOT to be used for urgent needs. For medical emergencies, dial 911. Now available from your iPhone and Android! Please provide this summary of care documentation to your next provider. Your primary care clinician is listed as Velma Juarez. If you have any questions after today's visit, please call [phone].

## 2017-08-08 NOTE — PROGRESS NOTES
[address]  [place], [address] [place] [address] Street  Phone: [phone]  Fax: [phone]        Rufus Dill  : 1929  PCP: Jordi Culver MD  2017    PROGRESS NOTE      ASSESSMENT AND PLAN    Melany Servin comes in to the office today for a right SI joint injection and PT f/u, which did not improve her symptoms. She had a positive right straight leg raise with radicular symptoms along the right L5 distrubution. This leads me to believe her symptoms today are primarily due to lumbar radiculopathy. She continues to have tenderness in the right SI, which could also be contributing to her symptoms. At this time, she was referred to get a lumbar MRI. I also prescribed her Gabapentin 300 mg TID. Pt will f/u in 2 weeks or sooner as needed. Diagnoses and all orders for this visit:    1. Lumbar radiculopathy  -     MRI LUMB SPINE WO CONT; Future  -     gabapentin (NEURONTIN) 300 mg capsule; Take 1 Cap by mouth three (3) times daily. Follow-up Disposition:  Return in about 2 weeks (around 2017), or if symptoms worsen or fail to improve. HISTORY OF PRESENT ILLNESS  Melany Servin is a 80 y.o. female. A&P / HPI from 2017  Melany Servin comes in to the office today c/o right-sided lumbar pain that is radiating into the lateral aspect of the right thigh.      Based upon the physical examination findings of a positive right straight leg raise, right AMERICA test, and distraction test, I am lead to believe her symptoms are caused by sacroiliitis.     She was referred for physical therapy and a right SI joint injection.        Updates from 17:  Pt presents for continued right-sided pain in the lumbar region that is radiating down the posterior aspect of the right lower extremity. At the last visit, she was referred to get a right SI joint injection, which did not improve her symptoms.  She was unable to complete the physical therapy due to the increase in symptoms. PAST MEDICAL HISTORY   Past Medical History:   Diagnosis Date    Arthritis     Chronic kidney disease     only has 1/2 left kidney    Diabetes ([address]     GERD (gastroesophageal reflux disease)     Gout     High cholesterol     Hypertension        Past Surgical History:   Procedure Laterality Date    HX UROLOGICAL      Kidney left    . MEDICATIONS      Current Outpatient Prescriptions   Medication Sig Dispense Refill    gabapentin (NEURONTIN) 300 mg capsule Take 1 Cap by mouth three (3) times daily. 90 Cap 2    glimepiride (AMARYL) 2 mg tablet Take  by mouth every morning.  amLODIPine (NORVASC) 5 mg tablet Take 5 mg by mouth daily.  lisinopril (PRINIVIL, ZESTRIL) 20 mg tablet Take  by mouth daily.  simvastatin (ZOCOR) 20 mg tablet Take  by mouth nightly.  allopurinol (ZYLOPRIM) 100 mg tablet Take  by mouth daily.  calcium-cholecalciferol, D3, (CALTRATE 600+D) tablet Take 1 Tab by mouth two (2) times a day.  cholecalciferol, vitamin D3, (VITAMIN D3) 2,000 unit tab Take  by mouth.  colchicine 0.6 mg tablet Take 0.6 mg by mouth daily as needed.  omeprazole (PRILOSEC) 20 mg capsule Take 20 mg by mouth daily as needed.  HYDROCHLOROTHIAZIDE PO Take  by mouth daily. ALLERGIES    Allergies   Allergen Reactions    Aspirin Shortness of Breath          SOCIAL HISTORY    Social History     Social History    Marital status:      Spouse name: N/A    Number of children: N/A    Years of education: N/A     Social History Main Topics    Smoking status: Never Smoker    Smokeless tobacco: Never Used    Alcohol use No    Drug use: No    Sexual activity: Not on file     Other Topics Concern    Not on file     Social History Narrative       FAMILY HISTORY  No family history on file.       REVIEW OF SYSTEMS  Review of Systems   Constitutional: Negative for chills, diaphoresis, fever, malaise/fatigue and weight loss. Respiratory: Negative for shortness of breath. Cardiovascular: Negative for chest pain and leg swelling. Gastrointestinal: Negative for constipation, nausea and vomiting. Neurological: Negative for dizziness, tingling, seizures, loss of consciousness, weakness and headaches. Psychiatric/Behavioral: The patient does not have insomnia. PHYSICAL EXAMINATION  Visit Vitals    /68    Pulse 86    Temp 98.2 °F (36.8 °C)    Resp 18    Wt 174 lb (78.9 kg)    SpO2 95%    BMI 30.82 kg/m2       Pain Assessment  6/27/2017   Location of Pain Back;Leg   Location Modifiers Right   Severity of Pain 8   Quality of Pain Dull;Aching   Frequency of Pain Constant   Result of Injury No           Constitutional:  Well developed, well nourished, in no acute distress. Psychiatric: Affect and mood are appropriate. Integumentary: No rashes or abrasions noted on exposed areas. SPINE/MUSCULOSKELETAL EXAM    Cervical spine:  Neck is midline. Normal muscle tone. No focal atrophy is noted. ROM pain free. Shoulder ROM intact. No tenderness to palpation. Negative Spurling's sign. Negative Tinel's sign. Negative Peralta's sign.       Sensation in the bilateral arms grossly intact to light touch.      Lumbar spine:  No rash, ecchymosis, or gross obliquity. No fasciculations. No focal atrophy is noted. No pain with hip ROM. Full range of motion. Tenderness to palpation. No tenderness to palpation at the sciatic notch. Right SI joint tender. Trochanters non tender. Positive right AMERICA test   Negative P4 test   Negative Compression test  Positive right Distraction test   Negative Gaenslen's test         Sensation in the bilateral legs grossly intact to light touch.     Updates from 08/08/17:  Positive right straight leg raise   Tenderness in the lumbar paraspinals     MOTOR:      Biceps  Triceps Deltoids Wrist Ext Wrist Flex Hand Intrin   Right 5/5 5/5 5/5 5/5 5/5 5/5   Left 5/5 5/5 5/5 5/5 5/5 5/5             Hip Flex  Quads Hamstrings Ankle DF EHL Ankle PF   Right 5/5 5/5 5/5 5/5 5/5 5/5   Left 5/5 5/5 5/5 5/5 5/5 5/5     DTRs are 2+ biceps, triceps, brachioradialis, patella, and Achilles.     Positive right Straight Leg raise. Squat not tested. No difficulty with tandem gait.      Ambulation without assistive device. FWB.       RADIOGRAPHS  Lumbar XR images taken on 06/27/2017 personally reviewed with patient:  1) Stable L4-5 anterolisthesis and severe disc space narrowing  2) No obvious fractures      reviewed     Ms. Bulmaro Talamantes has a reminder for a \"due or due soon\" health maintenance. I have asked that she contact her primary care provider for follow-up on this health maintenance.      This plan was reviewed with the patient and patient agrees. All questions were answered. More than half of this visit today was spent on counseling.     Written by Boni Goldstein, as dictated by Dr. Kel Gonzalez, Dr. Bolivar Yuen, confirm that all documentation is accurate.

## 2017-08-09 ENCOUNTER — HOSPITAL ENCOUNTER (OUTPATIENT)
Dept: MRI IMAGING | Age: 82
Discharge: HOME | End: 2017-08-09
Attending: PHYSICAL MEDICINE & REHABILITATION
Payer: MEDICARE

## 2017-08-09 DIAGNOSIS — M54.16 LUMBAR RADICULOPATHY: ICD-10-CM

## 2017-08-09 PROCEDURE — 72148 MRI LUMBAR SPINE W/O DYE: CPT

## 2017-08-22 ENCOUNTER — OFFICE VISIT (OUTPATIENT)
Dept: ORTHOPEDIC SURGERY | Age: 82
End: 2017-08-22

## 2017-08-22 VITALS
BODY MASS INDEX: 31.01 KG/M2 | SYSTOLIC BLOOD PRESSURE: 128 MMHG | DIASTOLIC BLOOD PRESSURE: 66 MMHG | WEIGHT: 175 LBS | HEART RATE: 76 BPM | HEIGHT: 63 IN

## 2017-08-22 DIAGNOSIS — M54.16 LUMBAR RADICULOPATHY: Primary | ICD-10-CM

## 2017-08-22 NOTE — MR AVS SNAPSHOT
Visit Information Date & Time Provider Department Dept. Phone Encounter #  
 8/22/2017  1:30 PM Kee Ma MD [facility] MAST [phone] [id number] Follow-up Instructions Return in about 3 weeks (around 9/12/2017), or if symptoms worsen or fail to improve. Your Appointments 9/18/2017 10:45 AM  
Follow Up with Kee Ma MD  
[address] [facility], [address] [facility] ONE [facility]) Appt Note: 3wk BLOCK FU; SWANN 8/29/17  
 [address] [facility] [id number] [phone]  
  
   
 [address] [facility] [address] Upcoming Health Maintenance Date Due DTaP/Tdap/Td series (1 - Tdap) 7/20/1950 ZOSTER VACCINE AGE 60> 5/20/1989 GLAUCOMA SCREENING Q2Y 7/20/1994 OSTEOPOROSIS SCREENING (DEXA) 7/20/1994 Pneumococcal 65+ Low/Medium Risk (1 of 2 - PCV13) 7/20/1994 MEDICARE YEARLY EXAM 7/20/1994 INFLUENZA AGE 9 TO ADULT 8/1/2017 Allergies as of 8/22/2017  Review Complete On: 8/22/2017 By: Fredrick Carbajal Severity Noted Reaction Type Reactions Aspirin Medium 01/23/2013   Systemic Shortness of Breath Current Immunizations  Never Reviewed No immunizations on file. Not reviewed this visit You Were Diagnosed With   
  
 Codes Comments Lumbar radiculopathy    -  Primary ICD-10-CM: M54.16 
ICD-9-CM: 724.4 Vitals BP Pulse Height(growth percentile) Weight(growth percentile) BMI OB Status 128/66 76 5' 3\" (1.6 m) 175 lb (79.4 kg) 31 kg/m2 Hysterectomy Smoking Status Never Smoker Vitals History BMI and BSA Data Body Mass Index Body Surface Area  
 31 kg/m 2 1.88 m 2 Preferred Pharmacy [facility] Phone [facility] [address] [facility] [phone] Your Updated Medication List  
  
   
 This list is accurate as of: 8/22/17  1:57 PM.  Always use your most recent med list.  
  
  
  
  
 allopurinol 100 mg tablet Commonly known as:  Ardia Erps Take  by mouth daily. amLODIPine 5 mg tablet Commonly known as:  Kohli Inks Take 5 mg by mouth daily. calcium-cholecalciferol (D3) tablet Commonly known as:  CALTRATE 600+D Take 1 Tab by mouth two (2) times a day. colchicine 0.6 mg tablet Take 0.6 mg by mouth daily as needed. gabapentin 300 mg capsule Commonly known as:  NEURONTIN Take 1 Cap by mouth three (3) times daily. glimepiride 2 mg tablet Commonly known as:  AMARYL Take  by mouth every morning. HYDROCHLOROTHIAZIDE PO Take  by mouth daily. lisinopril 20 mg tablet Commonly known as:  Sachi Drought Take  by mouth daily. omeprazole 20 mg capsule Commonly known as:  PRILOSEC Take 20 mg by mouth daily as needed. simvastatin 20 mg tablet Commonly known as:  ZOCOR Take  by mouth nightly. VITAMIN D3 2,000 unit Tab Generic drug:  cholecalciferol (vitamin D3) Take  by mouth. We Performed the Following SCHEDULE SURGERY [ITQ3969 Custom] Follow-up Instructions Return in about 3 weeks (around 9/12/2017), or if symptoms worsen or fail to improve. Please provide this summary of care documentation to your next provider. Your primary care clinician is listed as Katlyn Lackey. If you have any questions after today's visit, please call [phone].

## 2017-08-22 NOTE — PROGRESS NOTES
[address]  [address]  [place]  Phone: [phone]  Fax: [phone]        Jason Chaney  : 1929  PCP: Tyron Ferreira MD  2017    PROGRESS NOTE      ASSESSMENT AND PLAN    Amparo Collins comes in to the office today for a lumbar MRI f/u. The study showed marked arthropathy combined with prominence of dorsal epidural fat, anterolisthesis and disc bulge causes severe spinal canal stenosis at L4-5 with compression of the L4 nerve root. She presents today with a positive right straight leg raise along with decreased sensation at the L4 disturbution, which correlates well with the pathology shown on the MRI. I referred her to get a right L4 and L5 SNRB. Facet arthropathy    Pt will f/u in 3 weeks or sooner as needed. Diagnoses and all orders for this visit:    1. Lumbar radiculopathy  -     SCHEDULE SURGERY         Follow-up Disposition:  Return in about 3 weeks (around 2017), or if symptoms worsen or fail to improve. HISTORY OF PRESENT ILLNESS  Amparo Collins is a 80 y.o. female. A&P / HPI from 2017  Farzana Garcia in to the office today c/o right-sided lumbar pain that is radiating into the lateral aspect of the right thigh.       Based upon the physical examination findings of a positive right straight leg raise, right AMERICA test, and distraction test, I am lead to believe her symptoms are caused by sacroiliitis.      She was referred for physical therapy and a right SI joint injection.         Updates from 17:  Pt presents for continued right-sided pain in the lumbar region that is radiating down the posterior aspect of the right lower extremity. At the last visit, she was referred to get a right SI joint injection, which did not improve her symptoms. She was unable to complete the physical therapy due to the increase in symptoms.      Updates from 17:  Pt presents for a lumbar MRI f/u. The study showed significant evidence to prove lumbar radiculopathy. She notes that her symptoms have improved since the last visit. PAST MEDICAL HISTORY   Past Medical History:   Diagnosis Date    Arthritis     Chronic kidney disease     only has 1/2 left kidney    Diabetes ([address]     GERD (gastroesophageal reflux disease)     Gout     High cholesterol     Hypertension        Past Surgical History:   Procedure Laterality Date    HX UROLOGICAL      Kidney left    . MEDICATIONS      Current Outpatient Prescriptions   Medication Sig Dispense Refill    gabapentin (NEURONTIN) 300 mg capsule Take 1 Cap by mouth three (3) times daily. 90 Cap 2    glimepiride (AMARYL) 2 mg tablet Take  by mouth every morning.  amLODIPine (NORVASC) 5 mg tablet Take 5 mg by mouth daily.  lisinopril (PRINIVIL, ZESTRIL) 20 mg tablet Take  by mouth daily.  simvastatin (ZOCOR) 20 mg tablet Take  by mouth nightly.  allopurinol (ZYLOPRIM) 100 mg tablet Take  by mouth daily.  calcium-cholecalciferol, D3, (CALTRATE 600+D) tablet Take 1 Tab by mouth two (2) times a day.  cholecalciferol, vitamin D3, (VITAMIN D3) 2,000 unit tab Take  by mouth.  colchicine 0.6 mg tablet Take 0.6 mg by mouth daily as needed.  omeprazole (PRILOSEC) 20 mg capsule Take 20 mg by mouth daily as needed.  HYDROCHLOROTHIAZIDE PO Take  by mouth daily. ALLERGIES    Allergies   Allergen Reactions    Aspirin Shortness of Breath          SOCIAL HISTORY    Social History     Social History    Marital status:      Spouse name: N/A    Number of children: N/A    Years of education: N/A     Social History Main Topics    Smoking status: Never Smoker    Smokeless tobacco: Never Used    Alcohol use No    Drug use: No    Sexual activity: Not Asked     Other Topics Concern    None     Social History Narrative       FAMILY HISTORY  History reviewed.  No pertinent family history. REVIEW OF SYSTEMS  Review of Systems   Constitutional: Negative for chills, diaphoresis, fever, malaise/fatigue and weight loss. Respiratory: Negative for shortness of breath. Cardiovascular: Negative for chest pain and leg swelling. Gastrointestinal: Negative for constipation, nausea and vomiting. Neurological: Negative for dizziness, tingling, seizures, loss of consciousness, weakness and headaches. Psychiatric/Behavioral: The patient does not have insomnia. PHYSICAL EXAMINATION  Visit Vitals    /66    Pulse 76    Ht 5' 3\" (1.6 m)    Wt 175 lb (79.4 kg)    BMI 31 kg/m2       Pain Assessment  8/22/2017   Location of Pain Back;Leg   Location Modifiers Right   Severity of Pain 7   Quality of Pain Aching   Duration of Pain Persistent   Frequency of Pain Constant   Aggravating Factors Standing;Walking   Limiting Behavior Some   Relieving Factors Nothing   Result of Injury No           Constitutional:  Well developed, well nourished, in no acute distress. Psychiatric: Affect and mood are appropriate. Integumentary: No rashes or abrasions noted on exposed areas. SPINE/MUSCULOSKELETAL EXAM    Cervical spine:  Neck is midline. Normal muscle tone. No focal atrophy is noted. ROM pain free. Shoulder ROM intact. No tenderness to palpation. Negative Spurling's sign. Negative Tinel's sign. Negative Peralta's sign.       Sensation in the bilateral arms grossly intact to light touch.       Lumbar spine:  No rash, ecchymosis, or gross obliquity. No fasciculations. No focal atrophy is noted. No pain with hip ROM. Full range of motion. Tenderness to palpation. No tenderness to palpation at the sciatic notch. Right SI joint tender. Trochanters non tender.   Positive right AMERICA test   Negative P4 test   Negative Compression test  Positive right Distraction test   Negative Gaenslen's test          Sensation in the bilateral legs grossly intact to light touch.     Updates from 08/08/17:  Positive right straight leg raise   Tenderness in the lumbar paraspinals     Updates from 08/22/17:  Decreased sensation at the right L4 dermatome   Positive right straight leg raise     MOTOR:      Biceps  Triceps Deltoids Wrist Ext Wrist Flex Hand Intrin   Right 5/5 5/5 5/5 5/5 5/5 5/5   Left 5/5 5/5 5/5 5/5 5/5 5/5             Hip Flex  Quads Hamstrings Ankle DF EHL Ankle PF   Right 5/5 5/5 5/5 5/5 5/5 5/5   Left 5/5 5/5 5/5 5/5 5/5 5/5     DTRs are 2+ biceps, triceps, brachioradialis, patella, and Achilles.      Positive right Straight Leg raise. Squat not tested. No difficulty with tandem gait.       Ambulation without assistive device. FWB.       RADIOGRAPHS  Lumbar MRI images taken on 08/09/2017 personally reviewed with patient:  FINDINGS: Grade 1anterolisthesis of L4 on L5 by approximately 8 mm. No  associated pars interarticularis defects. Normal vertebral body heights. No  suspicious marrow replacing lesions. The conus medullaris looks normal  terminating at the lower T12 level. Cauda equina nerve roots are unremarkable. Paravertebral soft tissues are unremarkable. Absent visualization of the left  kidney.     Spinal canal and neural foramen:      T12-L1: Minimal disc bulge. No spinal canal or foraminal stenosis.     L1-2: Mild disc bulge. No spinal canal or foraminal stenosis.     L2-3: Mild disc bulge. Mild facet and ligamentum flavum hypertrophy. No spinal  canal stenosis. Mild to moderate right foraminal stenosis with no nerve root  impingement.     L3-4: Minimal disc bulge. 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 This  results in an enlarged combined L3-4 and L4-5 foraminal space which contains  both L3 and L4 nerve roots. There has been a left laminotomy. No spinal canal or  foraminal stenosis.  No evidence of nerve root impingement.     L4-5: Disc is mildly narrowed and diffusely bulging. Markedly hypertrophic  facets containing a small amount of degenerative fluid. The disproportionate  marked facet arthropathy is likely due to the developmental abnormality of the  L4 posterior elements causing abnormal stress on the facet joint spaces. There  is also prominence of the dorsal epidural fat which is mildly impressing the  thecal sac. Overall severe concentric narrowing of the thecal sac and spinal  stenosis. Severe right foraminal stenosis with compressed L4 nerve root.     L5-S1: No significant degenerative disc disease. Mildly hypertrophic facets. No  spinal canal or foraminal stenosis.     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 There also appears to  be a prior left laminotomy defect. Developmental abnormality causes abnormal  stresses on the L4-5 facets which demonstrate marked arthropathy. The marked  arthropathy combined with prominence of dorsal epidural fat, anterolisthesis and  disc bulge causes severe spinal canal stenosis at L4-5. The right neural foramen  is severely narrowed with compression of the L4 nerve root. Lumbar XR images taken on 06/27/2017 personally reviewed with patient:  1) Stable L4-5 anterolisthesis and severe disc space narrowing  2) No obvious fractures       reviewed      Ms. Key has a reminder for a \"due or due soon\" health maintenance. I have asked that she contact her primary care provider for follow-up on this health maintenance.       This plan was reviewed with the patient and patient agrees. All questions were answered. More than half of this visit today was spent on counseling.      Written by Lauryn Fine, as dictated by Dr. Crystal Neri, Dr. Rashmi Ryan, confirm that all documentation is accurate.

## 2017-08-30 ENCOUNTER — APPOINTMENT (OUTPATIENT)
Dept: GENERAL RADIOLOGY | Age: 82
End: 2017-08-30
Attending: PHYSICAL MEDICINE & REHABILITATION
Payer: MEDICARE

## 2017-08-30 ENCOUNTER — HOSPITAL ENCOUNTER (OUTPATIENT)
Age: 82
Setting detail: OUTPATIENT SURGERY
Discharge: HOME | End: 2017-08-30
Attending: PHYSICAL MEDICINE & REHABILITATION | Admitting: PHYSICAL MEDICINE & REHABILITATION
Payer: MEDICARE

## 2017-08-30 VITALS
WEIGHT: 175 LBS | RESPIRATION RATE: 16 BRPM | BODY MASS INDEX: 31.01 KG/M2 | TEMPERATURE: 97.6 F | HEART RATE: 70 BPM | OXYGEN SATURATION: 97 % | SYSTOLIC BLOOD PRESSURE: 121 MMHG | HEIGHT: 63 IN | DIASTOLIC BLOOD PRESSURE: 77 MMHG

## 2017-08-30 PROBLEM — M79.2 NEURITIS: Status: ACTIVE | Noted: 2017-08-30

## 2017-08-30 PROBLEM — M54.16 LUMBAR RADICULOPATHY: Status: ACTIVE | Noted: 2017-08-30

## 2017-08-30 LAB — GLUCOSE BLD STRIP.AUTO-MCNC: 123 MG/DL (ref 70–110)

## 2017-08-30 PROCEDURE — 74011000250 HC RX REV CODE- 250

## 2017-08-30 PROCEDURE — 77030003669 HC NDL SPN COOK -B: Performed by: PHYSICAL MEDICINE & REHABILITATION

## 2017-08-30 PROCEDURE — 77030003676 HC NDL SPN MPRI -A: Performed by: PHYSICAL MEDICINE & REHABILITATION

## 2017-08-30 PROCEDURE — 74011636320 HC RX REV CODE- 636/320

## 2017-08-30 PROCEDURE — 74011250637 HC RX REV CODE- 250/637: Performed by: PHYSICAL MEDICINE & REHABILITATION

## 2017-08-30 PROCEDURE — 82962 GLUCOSE BLOOD TEST: CPT

## 2017-08-30 PROCEDURE — 74011250636 HC RX REV CODE- 250/636

## 2017-08-30 PROCEDURE — 76010000009 HC PAIN MGT 0 TO 30 MIN PROC: Performed by: PHYSICAL MEDICINE & REHABILITATION

## 2017-08-30 RX ORDER — DEXAMETHASONE SODIUM PHOSPHATE 100 MG/10ML
INJECTION INTRAMUSCULAR; INTRAVENOUS AS NEEDED
Status: DISCONTINUED | OUTPATIENT
Start: 2017-08-30 | End: 2017-08-30 | Stop reason: HOSPADM

## 2017-08-30 RX ORDER — DIAZEPAM 5 MG/1
5-20 TABLET ORAL ONCE
Status: COMPLETED | OUTPATIENT
Start: 2017-08-30 | End: 2017-08-30

## 2017-08-30 RX ORDER — LIDOCAINE HYDROCHLORIDE 10 MG/ML
INJECTION, SOLUTION EPIDURAL; INFILTRATION; INTRACAUDAL; PERINEURAL AS NEEDED
Status: DISCONTINUED | OUTPATIENT
Start: 2017-08-30 | End: 2017-08-30 | Stop reason: HOSPADM

## 2017-08-30 RX ADMIN — DIAZEPAM 5 MG: 5 TABLET ORAL at 13:02

## 2017-08-30 NOTE — DISCHARGE INSTRUCTIONS
[facility]   ([facility])  Dr. Abhijit Rock, Dr. Irvin Mason, Dr. Weiss Duck not drive a car, operate heavy machinery or dangerous equipment for 24 hours. * Activity as tolerated; rest for the remainder of the day. * Resume pre-block medications including those for your family doctor. * Do not drink alcoholic beverages for 24 hours. Alcohol and the medications you have received may interact and cause an adverse reaction. * You may feel better this evening and worse tomorrow, as the numbing medications wears off and the steroid has yet to begin to work. After 48 hrs the steroid should begin to release bringing you relief. * You may shower this evening and remove any bandages. * Avoid hot tubs and heating pads for 24 hours. You may use cold packs on the procedure site as tolerated for the first 24 hours. * If a headache develops, drink plenty of fluids and rest.  Take over the counter medications for headache if needed. If the headache continues longer than 24 hours, call MD at the [address] [place] [address]. [phone]    * Continue taking pain medications as needed. * You may resume your regular diet if tolerated. Otherwise, start with sips of water and advance slowly. * If Diabetic: check your blood sugar three times a day for the next 3 days. If your sugar is greater than 300 call your family doctor. If your sugar is greater than 400, have someone transport you to the nearest Emergency Room. * If you experience any of the following problems, Please Call the [address] [place] [address] at [phone].         * Shortness of Breath    * Fever of 101 or higher    * Nausea / Vomiting    * Severe Headache    * Weakness or numbness in arms or legs that is not      resolving    * Prolonged increase in pain greater than 4 days      DISCHARGE SUMMARY from Nurse      PATIENT INSTRUCTIONS:    After oral sedation, for 24 hours or while taking prescription Narcotics:  · Limit your activities  · Do not drive and operate hazardous machinery  · Do not make important personal or business decisions  · Do  not drink alcoholic beverages  · If you have not urinated within 8 hours after discharge, please contact your surgeon on call. Report the following to your surgeon:  · Excessive pain, swelling, redness or odor of or around the surgical area  · Temperature over 101  · Nausea and vomiting lasting longer than 4 hours or if unable to take medications  · Any signs of decreased circulation or nerve impairment to extremity: change in color, persistent  numbness, tingling, coldness or increase pain  · Any questions            What to do at Home:  Recommended activity: Activity as tolerated, NO DRIVING FOR 12 Hours post injection          *  Please give a list of your current medications to your Primary Care Provider. *  Please update this list whenever your medications are discontinued, doses are      changed, or new medications (including over-the-counter products) are added. *  Please carry medication information at all times in case of emergency situations. These are general instructions for a healthy lifestyle:    No smoking/ No tobacco products/ Avoid exposure to second hand smoke    Surgeon General's Warning:  Quitting smoking now greatly reduces serious risk to your health. Obesity, smoking, and sedentary lifestyle greatly increases your risk for illness    A healthy diet, regular physical exercise & weight monitoring are important for maintaining a healthy lifestyle    You may be retaining fluid if you have a history of heart failure or if you experience any of the following symptoms:  Weight gain of 3 pounds or more overnight or 5 pounds in a week, increased swelling in our hands or feet or shortness of breath while lying flat in bed.   Please call your doctor as soon as you notice any of these symptoms; do not wait until your next office visit. Recognize signs and symptoms of STROKE:    F-face looks uneven    A-arms unable to move or move unevenly    S-speech slurred or non-existent    T-time-call 911 as soon as signs and symptoms begin-DO NOT go       Back to bed or wait to see if you get better-TIME IS BRAIN.

## 2017-08-30 NOTE — PROCEDURES
PROCEDURE NOTE      Patient Name: Barbara Bueno    Date of Procedure: August 30, 2017    Preoperative Diagnosis:  Acute right lumbar radiculopathy [M54.16]    Post Operative Diagnosis:  Acute right lumbar radiculopathy [M54.16]    Procedure :    right L4 Selective Nerve Root Block  right L5 Selective Nerve Root Block    Consent:  Informed consent was obtained prior to the procedure. The patient was given the opportunity to ask questions regarding the procedure and its associated risks. In addition to the potential risks associated with the procedure itself, the patient was informed both verbally and in writing of the potential side effects of the use of glucocorticoid. The patient appeared to comprehend the informed consent and desired to have the procedure performed. Procedure: The patient was placed in the prone position on the fluoroscopy table and the back was prepped and draped in the usual sterile manner. The exact spinal level was  identified using fluoroscopy, and Lidocaine 1 % was injected locally, a # 22 gauge spinal needle was passed to the transverse process. The depth was noted and the needle redirected to pass inferior and approximately one cm anterior to the transverse process. YES  1 cc of Isovue M-200 was used to verify positioning in the epidural and paravertebral space and outlined the course of the spinal nerve into the epidural space. The same procedure was repeated at each spinal level indicated above. A total of 30 mg of preservative free Dexamethasone and 5 cc of Lidocaine was slowly injected. The patient tolerated the procedure well. The injection area was cleaned and bandaids applied. Not excessive bleeding was noted. Patient dressed and discharged to home with instructions. Discussion: The patient tolerated the procedure well.                                               Sadie Goodwin MD  August 30, 2017

## 2017-08-30 NOTE — H&P
Date of Surgery Update:  Rigoberto Goodman was seen and examined. History and physical has been reviewed. The patient has been examined. There have been no significant clinical changes since the completion of the last office visit.       Signed By: Maria Fernanda Cohen MD     August 30, 2017 12:17 PM

## 2017-09-18 ENCOUNTER — OFFICE VISIT (OUTPATIENT)
Dept: ORTHOPEDIC SURGERY | Age: 82
End: 2017-09-18

## 2017-09-18 VITALS
DIASTOLIC BLOOD PRESSURE: 66 MMHG | RESPIRATION RATE: 18 BRPM | HEART RATE: 80 BPM | TEMPERATURE: 98.1 F | SYSTOLIC BLOOD PRESSURE: 150 MMHG | OXYGEN SATURATION: 94 %

## 2017-09-18 DIAGNOSIS — M54.16 LUMBAR RADICULOPATHY: Primary | ICD-10-CM

## 2017-09-18 RX ORDER — GABAPENTIN 300 MG/1
300 CAPSULE ORAL 3 TIMES DAILY
Qty: 90 CAP | Refills: 2 | Status: CANCELLED | OUTPATIENT
Start: 2017-09-18

## 2017-09-18 RX ORDER — GABAPENTIN 300 MG/1
600 CAPSULE ORAL 3 TIMES DAILY
Qty: 180 CAP | Refills: 2 | Status: SHIPPED | OUTPATIENT
Start: 2017-09-18

## 2017-09-18 NOTE — MR AVS SNAPSHOT
Visit Information Date & Time Provider Department Dept. Phone Encounter #  
 9/18/2017 10:45 AM Aditi Joshi MD [facility] [phone] [id number] Follow-up Instructions Return in about 3 weeks (around 10/9/2017), or if symptoms worsen or fail to improve. Routing History Follow-up and Disposition History Your Appointments 10/16/2017  9:45 AM  
Follow Up with Aditi Joshi MD  
[address], [address] [facility]) Appt Note: 3WK BLOCK FU; Alena Briones 10/20/17  
 [address] [facility] [address]  
[phone]  
  
   
 [address] [facility] [address] Upcoming Health Maintenance Date Due DTaP/Tdap/Td series (1 - Tdap) 7/20/1950 ZOSTER VACCINE AGE 60> 5/20/1989 GLAUCOMA SCREENING Q2Y 7/20/1994 OSTEOPOROSIS SCREENING (DEXA) 7/20/1994 Pneumococcal 65+ Low/Medium Risk (1 of 2 - PCV13) 7/20/1994 MEDICARE YEARLY EXAM 7/20/1994 INFLUENZA AGE 9 TO ADULT 8/1/2017 Allergies as of 9/18/2017  Review Complete On: 9/18/2017 By: Aditi Joshi MD  
  
 Severity Noted Reaction Type Reactions Aspirin Medium 01/23/2013   Systemic Shortness of Breath Current Immunizations  Never Reviewed No immunizations on file. Not reviewed this visit You Were Diagnosed With   
  
 Codes Comments Lumbar radiculopathy    -  Primary ICD-10-CM: M54.16 
ICD-9-CM: 724.4 Vitals BP Pulse Temp Resp SpO2 OB Status 150/66 80 98.1 °F (36.7 °C) 18 94% Hysterectomy Smoking Status Never Smoker Preferred Pharmacy Pharmacy Name Phone [facility] [address] [place] [address] [phone] Your Updated Medication List  
  
   
This list is accurate as of: 9/18/17 12:06 PM.  Always use your most recent med list.  
  
  
  
  
 allopurinol 100 mg tablet Commonly known as:  Audrey Fulling Take  by mouth daily. amLODIPine 5 mg tablet Commonly known as:  Ivonne Pall Take 5 mg by mouth daily. calcium-cholecalciferol (D3) tablet Commonly known as:  CALTRATE 600+D Take 1 Tab by mouth two (2) times a day. colchicine 0.6 mg tablet Take 0.6 mg by mouth daily as needed. gabapentin 300 mg capsule Commonly known as:  NEURONTIN Take 2 Caps by mouth three (3) times daily. glimepiride 2 mg tablet Commonly known as:  AMARYL Take  by mouth every morning. HYDROCHLOROTHIAZIDE PO Take  by mouth daily. lisinopril 20 mg tablet Commonly known as:  Helon Estrin Take  by mouth daily. omeprazole 20 mg capsule Commonly known as:  PRILOSEC Take 20 mg by mouth daily as needed. simvastatin 20 mg tablet Commonly known as:  ZOCOR Take  by mouth nightly. VITAMIN D3 2,000 unit Tab Generic drug:  cholecalciferol (vitamin D3) Take  by mouth. Prescriptions Sent to Pharmacy Refills  
 gabapentin (NEURONTIN) 300 mg capsule 2 Sig: Take 2 Caps by mouth three (3) times daily. Class: Normal  
 Pharmacy: [facility] [address] [place] [address] #: [phone] Route: Oral  
  
We Performed the Following SCHEDULE SURGERY [[id number] Custom] Follow-up Instructions Return in about 3 weeks (around 10/9/2017), or if symptoms worsen or fail to improve. Patient Instructions Gabapentin (By mouth) Gabapentin (kyle-a-PEN-tin) Treats seizures and pain caused by shingles. Brand Name(s): ACTIVE-PAC with Gabapentin, Convenience Ruddy, Cyclo/Faith 10/300 Pack, FusePaq Fanatrex, Faith-V, Gralise, Neurontin, Worship-Covert There may be other brand names for this medicine. When This Medicine Should Not Be Used: This medicine is not right for everyone.  Do not use it if you had an allergic reaction to gabapentin. How to Use This Medicine:  
Capsule, Liquid, Tablet · Take your medicine as directed. Your dose may need to be changed several times to find what works best for you. If you have epilepsy, do not allow more than 12 hours to pass between doses. · Capsule: Swallow the capsule whole with plenty of water. Do not open, crush, or chew it. · Gralise® tablet: Swallow the tablet whole . Do not crush, break, or chew it. · Neurontin® tablet: If you break a tablet into 2 pieces, use the second half as your next dose. If you don't use it within 28 days, throw it away. · Measure the oral liquid medicine with a marked measuring spoon, oral syringe, or medicine cup. · This medicine should come with a Medication Guide. Ask your pharmacist for a copy if you do not have one. · Missed dose: Take a dose as soon as you remember. If it is almost time for your next dose, wait until then and take a regular dose. Do not take extra medicine to make up for a missed dose. · Store the medicine in a closed container at room temperature, away from heat, moisture, and direct light. Store the Neurontin® oral liquid in the refrigerator. Do not freeze. Drugs and Foods to Avoid: Ask your doctor or pharmacist before using any other medicine, including over-the-counter medicines, vitamins, and herbal products. · Some medicines can affect how gabapentin works. Tell your doctor if you also use any of the following: ¨ Hydrocodone ¨ Morphine · If you take an antacid, wait at least 2 hours before you take gabapentin. · Tell your doctor if you use anything else that makes you sleepy. Some examples are allergy medicine, narcotic pain medicine, and alcohol. Warnings While Using This Medicine: · Tell your doctor if you are pregnant or breastfeeding, or if you have kidney problems or are receiving dialysis. Tell your doctor if you have a history of depression or mental health problems. · This medicine may increase depression or thoughts of suicide. Tell your doctor right away if you start to feel more depressed or think about hurting yourself. · This medicine may cause a serious allergic reaction called multiorgan hypersensitivity, which can damage organs and be life-threatening. · Do not stop using this medicine suddenly. Your doctor will need to slowly decrease your dose before you stop it completely. If you take this medicine to prevent seizures, your seizures may return or occur more often if you stop this medicine suddenly. · This medicine may make you dizzy or drowsy. Do not drive or do anything else that could be dangerous until you know how this medicine affects you. · Tell any doctor or dentist who treats you that you are using this medicine. This medicine may affect certain medical test results. · Your doctor will check your progress and the effects of this medicine at regular visits. Keep all appointments. · Keep all medicine out of the reach of children. Never share your medicine with anyone. Possible Side Effects While Using This Medicine:  
Call your doctor right away if you notice any of these side effects: · Allergic reaction: Itching or hives, swelling in your face or hands, swelling or tingling in your mouth or throat, chest tightness, trouble breathing · Behavior problems, aggression, restlessness, trouble concentrating, moodiness (especially in children) · Blistering, peeling, red skin rash · Change in how much or how often you urinate, bloody or cloudy urine, 
· Chest pain, fast heartbeat, trouble breathing · Dark urine or pale stools, nausea, vomiting, loss of appetite, stomach pain, yellow skin or eyes · Fever, rash, swollen or tender glands in the neck, armpit, or groin · Problems with coordination, shakiness, unsteadiness · Rapid weight gain, swelling in your hands, ankles, or feet · Unusual moods or behaviors, thoughts of hurting yourself, feeling depressed If you notice these less serious side effects, talk with your doctor: · Dizziness, drowsiness, sleepiness, tiredness If you notice other side effects that you think are caused by this medicine, tell your doctor. Call your doctor for medical advice about side effects. You may report side effects to FDA at 2-823-UTD-1276 © 2017 [employer] Information is for End User's use only and may not be sold, redistributed or otherwise used for commercial purposes. The above information is an  only. It is not intended as medical advice for individual conditions or treatments. Talk to your doctor, nurse or pharmacist before following any medical regimen to see if it is safe and effective for you. Gabapentin (Neurontin) instructions: We will increase your current dose from 300 mg (1 pill) three times a day to 600 mg (2 pills) three times a day. Morning Afternoon Night Week 1 1 pill 1 pill 2 pills Week 2 2 pills 1 pill 2 pills Week 3 and onwards 2 pills 2 pills 2 pills Week 1: Take an additional 300 mg pill at night to your current dose so that you are taking 1 pill in the morning, 1 pill in the afternoon, and 2 pills at night. Week 2: Take an additional 300 mg pill in the morning so that you are taking 2 pills in the morning, 1 pill in the afternoon, and 2 pills at night. Week 3 and onwards: Take an additional 300 mg pill in the afternoon so that you are taking 2 pills in the morning, 2 pills in the afternoon, and 2 pills at night. Continue taking this dose each day. Please provide this summary of care documentation to your next provider. Your primary care clinician is listed as Willie Bettencourt. If you have any questions after today's visit, please call [phone].

## 2017-09-18 NOTE — PATIENT INSTRUCTIONS
Gabapentin (By mouth)   Gabapentin (kyle-a-PEN-tin)  Treats seizures and pain caused by shingles. Brand Name(s): ACTIVE-PAC with Gabapentin, Convenience Ruddy, Cyclo/Faith 10/300 Pack, FusePaq Fanatrex, Faith-V, Ehingen, Neurontin, SmartRx Faith Kit   There may be other brand names for this medicine. When This Medicine Should Not Be Used: This medicine is not right for everyone. Do not use it if you had an allergic reaction to gabapentin. How to Use This Medicine:   Capsule, Liquid, Tablet  · Take your medicine as directed. Your dose may need to be changed several times to find what works best for you. If you have epilepsy, do not allow more than 12 hours to pass between doses. · Capsule: Swallow the capsule whole with plenty of water. Do not open, crush, or chew it. · Gralise® tablet: Swallow the tablet whole . Do not crush, break, or chew it. · Neurontin® tablet: If you break a tablet into 2 pieces, use the second half as your next dose. If you don't use it within 28 days, throw it away. · Measure the oral liquid medicine with a marked measuring spoon, oral syringe, or medicine cup. · This medicine should come with a Medication Guide. Ask your pharmacist for a copy if you do not have one. · Missed dose: Take a dose as soon as you remember. If it is almost time for your next dose, wait until then and take a regular dose. Do not take extra medicine to make up for a missed dose. · Store the medicine in a closed container at room temperature, away from heat, moisture, and direct light. Store the Neurontin® oral liquid in the refrigerator. Do not freeze. Drugs and Foods to Avoid:   Ask your doctor or pharmacist before using any other medicine, including over-the-counter medicines, vitamins, and herbal products. · Some medicines can affect how gabapentin works.  Tell your doctor if you also use any of the following:   ¨ Hydrocodone  ¨ Morphine  · If you take an antacid, wait at least 2 hours before you take gabapentin. · Tell your doctor if you use anything else that makes you sleepy. Some examples are allergy medicine, narcotic pain medicine, and alcohol. Warnings While Using This Medicine:   · Tell your doctor if you are pregnant or breastfeeding, or if you have kidney problems or are receiving dialysis. Tell your doctor if you have a history of depression or mental health problems. · This medicine may increase depression or thoughts of suicide. Tell your doctor right away if you start to feel more depressed or think about hurting yourself. · This medicine may cause a serious allergic reaction called multiorgan hypersensitivity, which can damage organs and be life-threatening. · Do not stop using this medicine suddenly. Your doctor will need to slowly decrease your dose before you stop it completely. If you take this medicine to prevent seizures, your seizures may return or occur more often if you stop this medicine suddenly. · This medicine may make you dizzy or drowsy. Do not drive or do anything else that could be dangerous until you know how this medicine affects you. · Tell any doctor or dentist who treats you that you are using this medicine. This medicine may affect certain medical test results. · Your doctor will check your progress and the effects of this medicine at regular visits. Keep all appointments. · Keep all medicine out of the reach of children. Never share your medicine with anyone.   Possible Side Effects While Using This Medicine:   Call your doctor right away if you notice any of these side effects:  · Allergic reaction: Itching or hives, swelling in your face or hands, swelling or tingling in your mouth or throat, chest tightness, trouble breathing  · Behavior problems, aggression, restlessness, trouble concentrating, moodiness (especially in children)  · Blistering, peeling, red skin rash  · Change in how much or how often you urinate, bloody or cloudy urine,  · Chest pain, fast heartbeat, trouble breathing  · Dark urine or pale stools, nausea, vomiting, loss of appetite, stomach pain, yellow skin or eyes  · Fever, rash, swollen or tender glands in the neck, armpit, or groin  · Problems with coordination, shakiness, unsteadiness  · Rapid weight gain, swelling in your hands, ankles, or feet  · Unusual moods or behaviors, thoughts of hurting yourself, feeling depressed  If you notice these less serious side effects, talk with your doctor:   · Dizziness, drowsiness, sleepiness, tiredness  If you notice other side effects that you think are caused by this medicine, tell your doctor. Call your doctor for medical advice about side effects. You may report side effects to FDA at 9-969-DIS-4763  © 2017 [address] [employer] Information is for End User's use only and may not be sold, redistributed or otherwise used for commercial purposes. The above information is an  only. It is not intended as medical advice for individual conditions or treatments. Talk to your doctor, nurse or pharmacist before following any medical regimen to see if it is safe and effective for you. Gabapentin (Neurontin) instructions: We will increase your current dose from 300 mg (1 pill) three times a day to 600 mg (2 pills) three times a day. Morning Afternoon Night   Week 1 1 pill 1 pill 2 pills   Week 2 2 pills 1 pill 2 pills   Week 3 and onwards 2 pills 2 pills 2 pills       Week 1: Take an additional 300 mg pill at night to your current dose so that you are taking 1 pill in the morning, 1 pill in the afternoon, and 2 pills at night. Week 2: Take an additional 300 mg pill in the morning so that you are taking 2 pills in the morning, 1 pill in the afternoon, and 2 pills at night. Week 3 and onwards: Take an additional 300 mg pill in the afternoon so that you are taking 2 pills in the morning, 2 pills in the afternoon, and 2 pills at night.  Continue taking this dose each day.

## 2017-09-18 NOTE — PROGRESS NOTES
[address]  [address], [address]  [facility], [address]  Phone: [phone]  Fax: [phone]        Jason Chaney  : 1929  PCP: Tyron Ferreira MD  2017    PROGRESS NOTE      ASSESSMENT AND PLAN    Amparo Collins comes in to the office today for a right L4 and L5 SNRB f/u. The procedure provided significant relief, however she continues to have residual radicular symptoms. Her sensation is now equal bilaterally at the L4 dermatomes    I referred her to have a second right L4 and L5 SNRB. I also increased her Gabapentin to 600 mg TID. Pt will f/u in 3 weeks or sooner as needed. Diagnoses and all orders for this visit:    1. Lumbar radiculopathy  -     SCHEDULE SURGERY  -     gabapentin (NEURONTIN) 300 mg capsule; Take 2 Caps by mouth three (3) times daily. Follow-up Disposition:  Return in about 3 weeks (around 10/9/2017), or if symptoms worsen or fail to improve. HISTORY OF PRESENT ILLNESS  Amparo Collins is a 80 y.o. female. A&P / HPI from 2017  Farzana Garcia in to the office today c/o right-sided lumbar pain that is radiating into the lateral aspect of the right thigh.       Based upon the physical examination findings of a positive right straight leg raise, right AMERICA test, and distraction test, I am lead to believe her symptoms are caused by sacroiliitis.      She was referred for physical therapy and a right SI joint injection.           Updates from 17:  Pt presents for continued right-sided pain in the lumbar region that is radiating down the posterior aspect of the right lower extremity. At the last visit, she was referred to get a right SI joint injection, which did not improve her symptoms. She was unable to complete the physical therapy due to the increase in symptoms.      Updates from 17:  Pt presents for a lumbar MRI f/u.  The study showed significant evidence to prove lumbar radiculopathy. She notes that her symptoms have improved since the last visit.        Updates from 09/18/17:  Pt presents for right L4 and L5 SNRB f/u. She reports the injection did provide relief however, she continues to have radicular symptoms. PAST MEDICAL HISTORY   Past Medical History:   Diagnosis Date    Arthritis     Chronic kidney disease     only has 1/2 left kidney    Diabetes ([address]     GERD (gastroesophageal reflux disease)     Gout     High cholesterol     Hypertension        Past Surgical History:   Procedure Laterality Date    HX UROLOGICAL      Kidney left    . MEDICATIONS      Current Outpatient Prescriptions   Medication Sig Dispense Refill    gabapentin (NEURONTIN) 300 mg capsule Take 1 Cap by mouth three (3) times daily. 90 Cap 2    glimepiride (AMARYL) 2 mg tablet Take  by mouth every morning.  amLODIPine (NORVASC) 5 mg tablet Take 5 mg by mouth daily.  lisinopril (PRINIVIL, ZESTRIL) 20 mg tablet Take  by mouth daily.  simvastatin (ZOCOR) 20 mg tablet Take  by mouth nightly.  allopurinol (ZYLOPRIM) 100 mg tablet Take  by mouth daily.  calcium-cholecalciferol, D3, (CALTRATE 600+D) tablet Take 1 Tab by mouth two (2) times a day.  cholecalciferol, vitamin D3, (VITAMIN D3) 2,000 unit tab Take  by mouth.  colchicine 0.6 mg tablet Take 0.6 mg by mouth daily as needed.  omeprazole (PRILOSEC) 20 mg capsule Take 20 mg by mouth daily as needed.  HYDROCHLOROTHIAZIDE PO Take  by mouth daily.           ALLERGIES    Allergies   Allergen Reactions    Aspirin Shortness of Breath          SOCIAL HISTORY    Social History     Social History    Marital status:      Spouse name: N/A    Number of children: N/A    Years of education: N/A     Social History Main Topics    Smoking status: Never Smoker    Smokeless tobacco: Never Used    Alcohol use No    Drug use: No    Sexual activity: Not on file     Other Topics Concern    Not on file     Social History Narrative       FAMILY HISTORY  No family history on file. REVIEW OF SYSTEMS  Review of Systems   Constitutional: Negative for chills, diaphoresis, fever, malaise/fatigue and weight loss. Respiratory: Negative for shortness of breath. Cardiovascular: Negative for chest pain and leg swelling. Gastrointestinal: Negative for constipation, nausea and vomiting. Neurological: Negative for dizziness, tingling, seizures, loss of consciousness, weakness and headaches. Psychiatric/Behavioral: The patient does not have insomnia. PHYSICAL EXAMINATION  Visit Vitals    /66    Pulse 80    Temp 98.1 °F (36.7 °C)    Resp 18    SpO2 94%       Pain Assessment  8/22/2017   Location of Pain Back;Leg   Location Modifiers Right   Severity of Pain 7   Quality of Pain Aching   Duration of Pain Persistent   Frequency of Pain Constant   Aggravating Factors Standing;Walking   Limiting Behavior Some   Relieving Factors Nothing   Result of Injury No           Constitutional:  Well developed, well nourished, in no acute distress. Psychiatric: Affect and mood are appropriate. Integumentary: No rashes or abrasions noted on exposed areas. SPINE/MUSCULOSKELETAL EXAM    Cervical spine:  Neck is midline. Normal muscle tone. No focal atrophy is noted. ROM pain free. Shoulder ROM intact. No tenderness to palpation. Negative Spurling's sign. Negative Tinel's sign. Negative Peralta's sign.       Sensation in the bilateral arms grossly intact to light touch.       Lumbar spine:  No rash, ecchymosis, or gross obliquity. No fasciculations. No focal atrophy is noted. No pain with hip ROM. Full range of motion. Tenderness to palpation. No tenderness to palpation at the sciatic notch. Right SI joint tender. Trochanters non tender.   Positive right AMERICA test   Negative P4 test   Negative Compression test  Positive right Distraction test   Negative Gaenslen's test          Sensation in the bilateral legs grossly intact to light touch.      Updates from 08/08/17:  Positive right straight leg raise   Tenderness in the lumbar paraspinals      Updates from 08/22/17:  Decreased sensation at the right L4 dermatome   Positive right straight leg raise     MOTOR:      Biceps  Triceps Deltoids Wrist Ext Wrist Flex Hand Intrin   Right 5/5 5/5 5/5 5/5 5/5 5/5   Left 5/5 5/5 5/5 5/5 5/5 5/5             Hip Flex  Quads Hamstrings Ankle DF EHL Ankle PF   Right 5/5 5/5 5/5 5/5 5/5 5/5   Left 5/5 5/5 5/5 5/5 5/5 5/5     DTRs are 2+ biceps, triceps, brachioradialis, patella, and Achilles.      Positive right Straight Leg raise. Squat not tested. No difficulty with tandem gait.       Ambulation without assistive device. FWB.       RADIOGRAPHS  Lumbar MRI images taken on 08/09/2017 personally reviewed with patient:  FINDINGS: Grade 1anterolisthesis of L4 on L5 by approximately 8 mm. No  associated pars interarticularis defects. Normal vertebral body heights. No  suspicious marrow replacing lesions. The conus medullaris looks normal  terminating at the lower T12 level. Cauda equina nerve roots are unremarkable. Paravertebral soft tissues are unremarkable. Absent visualization of the left  kidney.      Spinal canal and neural foramen:       T12-L1: Minimal disc bulge. No spinal canal or foraminal stenosis.      L1-2: Mild disc bulge. No spinal canal or foraminal stenosis.      L2-3: Mild disc bulge. Mild facet and ligamentum flavum hypertrophy. No spinal  canal stenosis. Mild to moderate right foraminal stenosis with no nerve root  impingement.      L3-4: Minimal disc bulge. 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 This  results in an enlarged combined L3-4 and L4-5 foraminal space which contains  both L3 and L4 nerve roots. There has been a left laminotomy. No spinal canal or  foraminal stenosis.  No evidence of nerve root impingement.      L4-5: Disc is mildly narrowed and diffusely bulging. Markedly hypertrophic  facets containing a small amount of degenerative fluid. The disproportionate  marked facet arthropathy is likely due to the developmental abnormality of the  L4 posterior elements causing abnormal stress on the facet joint spaces. There  is also prominence of the dorsal epidural fat which is mildly impressing the  thecal sac. Overall severe concentric narrowing of the thecal sac and spinal  stenosis. Severe right foraminal stenosis with compressed L4 nerve root.      L5-S1: No significant degenerative disc disease. Mildly hypertrophic facets. No  spinal canal or foraminal stenosis.      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 There also appears to  be a prior left laminotomy defect. Developmental abnormality causes abnormal  stresses on the L4-5 facets which demonstrate marked arthropathy. The marked  arthropathy combined with prominence of dorsal epidural fat, anterolisthesis and  disc bulge causes severe spinal canal stenosis at L4-5. The right neural foramen  is severely narrowed with compression of the L4 nerve root.     Lumbar XR images taken on 06/27/2017 personally reviewed with patient:  1) Stable L4-5 anterolisthesis and severe disc space narrowing  2) No obvious fractures       reviewed      Ms. Key has a reminder for a \"due or due soon\" health maintenance. I have asked that she contact her primary care provider for follow-up on this health maintenance.       This plan was reviewed with the patient and patient agrees. All questions were answered.  More than half of this visit today was spent on counseling.      Written by Ga Hoover, as dictated by Dr. Frank Caldera, Dr. Gregoria Christy, confirm that all documentation is accurate.

## 2017-09-20 ENCOUNTER — APPOINTMENT (OUTPATIENT)
Dept: GENERAL RADIOLOGY | Age: 82
End: 2017-09-20
Attending: PHYSICAL MEDICINE & REHABILITATION
Payer: MEDICARE

## 2017-09-20 ENCOUNTER — HOSPITAL ENCOUNTER (OUTPATIENT)
Age: 82
Setting detail: OUTPATIENT SURGERY
Discharge: HOME | End: 2017-09-20
Attending: PHYSICAL MEDICINE & REHABILITATION | Admitting: PHYSICAL MEDICINE & REHABILITATION
Payer: MEDICARE

## 2017-09-20 VITALS
OXYGEN SATURATION: 94 % | DIASTOLIC BLOOD PRESSURE: 69 MMHG | TEMPERATURE: 98.3 F | SYSTOLIC BLOOD PRESSURE: 152 MMHG | HEIGHT: 63 IN | BODY MASS INDEX: 31.01 KG/M2 | WEIGHT: 175 LBS | HEART RATE: 74 BPM | RESPIRATION RATE: 20 BRPM

## 2017-09-20 LAB — GLUCOSE BLD STRIP.AUTO-MCNC: 154 MG/DL (ref 70–110)

## 2017-09-20 PROCEDURE — 74011250636 HC RX REV CODE- 250/636

## 2017-09-20 PROCEDURE — 74011250637 HC RX REV CODE- 250/637: Performed by: PHYSICAL MEDICINE & REHABILITATION

## 2017-09-20 PROCEDURE — 82962 GLUCOSE BLOOD TEST: CPT

## 2017-09-20 PROCEDURE — 77030003669 HC NDL SPN COOK -B: Performed by: PHYSICAL MEDICINE & REHABILITATION

## 2017-09-20 PROCEDURE — 76010000009 HC PAIN MGT 0 TO 30 MIN PROC: Performed by: PHYSICAL MEDICINE & REHABILITATION

## 2017-09-20 PROCEDURE — 74011636320 HC RX REV CODE- 636/320

## 2017-09-20 PROCEDURE — 74011000250 HC RX REV CODE- 250

## 2017-09-20 RX ORDER — DIAZEPAM 5 MG/1
5-20 TABLET ORAL ONCE
Status: COMPLETED | OUTPATIENT
Start: 2017-09-20 | End: 2017-09-20

## 2017-09-20 RX ORDER — LIDOCAINE HYDROCHLORIDE 10 MG/ML
INJECTION, SOLUTION EPIDURAL; INFILTRATION; INTRACAUDAL; PERINEURAL AS NEEDED
Status: DISCONTINUED | OUTPATIENT
Start: 2017-09-20 | End: 2017-09-20 | Stop reason: HOSPADM

## 2017-09-20 RX ORDER — DEXAMETHASONE SODIUM PHOSPHATE 100 MG/10ML
INJECTION INTRAMUSCULAR; INTRAVENOUS AS NEEDED
Status: DISCONTINUED | OUTPATIENT
Start: 2017-09-20 | End: 2017-09-20 | Stop reason: HOSPADM

## 2017-09-20 RX ADMIN — DIAZEPAM 5 MG: 5 TABLET ORAL at 13:24

## 2017-09-20 NOTE — H&P
Date of Surgery Update:  Brooke Shea was seen and examined. History and physical has been reviewed. The patient has been examined. There have been no significant clinical changes since the completion of the last office visit.       Signed By: Alexandra Romo MD     September 20, 2017 1:27 PM

## 2017-09-20 NOTE — DISCHARGE INSTRUCTIONS
[facility]   ([facility])  Dr. Elisha Martinez, Dr. Robson Veronica Dr. Sunday Glanctierney not drive a car, operate heavy machinery or dangerous equipment for 24 hours. * Activity as tolerated; rest for the remainder of the day. * Resume pre-block medications including those for your family doctor. * Do not drink alcoholic beverages for 24 hours. Alcohol and the medications you have received may interact and cause an adverse reaction. * You may feel better this evening and worse tomorrow, as the numbing medications wears off and the steroid has yet to begin to work. After 48 hrs the steroid should begin to release bringing you relief. * You may shower this evening and remove any bandages. * Avoid hot tubs and heating pads for 24 hours. You may use cold packs on the procedure site as tolerated for the first 24 hours. * If a headache develops, drink plenty of fluids and rest.  Take over the counter medications for headache if needed. If the headache continues longer than 24 hours, call MD at the [address] [place] [address]. [phone]    * Continue taking pain medications as needed. * You may resume your regular diet if tolerated. Otherwise, start with sips of water and advance slowly. * If Diabetic: check your blood sugar three times a day for the next 3 days. If your sugar is greater than 300 call your family doctor. If your sugar is greater than 400, have someone transport you to the nearest Emergency Room. * If you experience any of the following problems, Please Call the [address] [place] [address] at [phone].         * Shortness of Breath    * Fever of 101 or higher    * Nausea / Vomiting    * Severe Headache    * Weakness or numbness in arms or legs that is not      resolving    * Prolonged increase in pain greater than 4 days      DISCHARGE SUMMARY from Nurse      PATIENT INSTRUCTIONS:    After oral sedation, for 24 hours or while taking prescription Narcotics:  · Limit your activities  · Do not drive and operate hazardous machinery  · Do not make important personal or business decisions  · Do  not drink alcoholic beverages  · If you have not urinated within 8 hours after discharge, please contact your surgeon on call. Report the following to your surgeon:  · Excessive pain, swelling, redness or odor of or around the surgical area  · Temperature over 101  · Nausea and vomiting lasting longer than 4 hours or if unable to take medications  · Any signs of decreased circulation or nerve impairment to extremity: change in color, persistent  numbness, tingling, coldness or increase pain  · Any questions            What to do at Home:  Recommended activity: Activity as tolerated, NO DRIVING FOR 12 Hours post injection          *  Please give a list of your current medications to your Primary Care Provider. *  Please update this list whenever your medications are discontinued, doses are      changed, or new medications (including over-the-counter products) are added. *  Please carry medication information at all times in case of emergency situations. These are general instructions for a healthy lifestyle:    No smoking/ No tobacco products/ Avoid exposure to second hand smoke    Surgeon General's Warning:  Quitting smoking now greatly reduces serious risk to your health. Obesity, smoking, and sedentary lifestyle greatly increases your risk for illness    A healthy diet, regular physical exercise & weight monitoring are important for maintaining a healthy lifestyle    You may be retaining fluid if you have a history of heart failure or if you experience any of the following symptoms:  Weight gain of 3 pounds or more overnight or 5 pounds in a week, increased swelling in our hands or feet or shortness of breath while lying flat in bed.   Please call your doctor as soon as you notice any of these symptoms; do not wait until your next office visit. Recognize signs and symptoms of STROKE:    F-face looks uneven    A-arms unable to move or move unevenly    S-speech slurred or non-existent    T-time-call 911 as soon as signs and symptoms begin-DO NOT go       Back to bed or wait to see if you get better-TIME IS BRAIN.

## 2017-09-20 NOTE — PROCEDURES
PROCEDURE NOTE      Patient Name: Xochitl Stone    Date of Procedure: September 20, 2017    Preoperative Diagnosis:  Lumbar radiculopathy [M54.16]    Post Operative Diagnosis:  Lumbar radiculopathy [M54.16]    Procedure :    right L4 Selective Nerve Root Block  right L5 Selective Nerve Root Block    Consent:  Informed consent was obtained prior to the procedure. The patient was given the opportunity to ask questions regarding the procedure and its associated risks. In addition to the potential risks associated with the procedure itself, the patient was informed both verbally and in writing of the potential side effects of the use of glucocorticoid. The patient appeared to comprehend the informed consent and desired to have the procedure performed. Procedure: The patient was placed in the prone position on the fluoroscopy table and the back was prepped and draped in the usual sterile manner. The exact spinal level was  identified using fluoroscopy, and Lidocaine 1 % was injected locally, a # 22 gauge spinal needle was passed to the transverse process. The depth was noted and the needle redirected to pass inferior and approximately one cm anterior to the transverse process. YES  1 cc of Isovue M-200 was used to verify positioning in the epidural and paravertebral space and outlined the course of the spinal nerve into the epidural space. The same procedure was repeated at each spinal level indicated above. A total of 30 mg of preservative free Dexamethasone and 5 cc of Lidocaine was slowly injected. The patient tolerated the procedure well. The injection area was cleaned and bandaids applied. Not excessive bleeding was noted. Patient dressed and discharged to home with instructions. Discussion: The patient tolerated the procedure well.                                               Gage Oliva MD  September 20, 2017

## 2017-10-16 ENCOUNTER — OFFICE VISIT (OUTPATIENT)
Dept: ORTHOPEDIC SURGERY | Age: 82
End: 2017-10-16

## 2017-10-16 VITALS
RESPIRATION RATE: 18 BRPM | HEIGHT: 63 IN | TEMPERATURE: 97.6 F | SYSTOLIC BLOOD PRESSURE: 139 MMHG | OXYGEN SATURATION: 92 % | DIASTOLIC BLOOD PRESSURE: 64 MMHG | WEIGHT: 175 LBS | HEART RATE: 87 BPM | BODY MASS INDEX: 31.01 KG/M2

## 2017-10-16 DIAGNOSIS — M53.3 SACROILIAC JOINT PAIN: ICD-10-CM

## 2017-10-16 DIAGNOSIS — M79.18 MYOFASCIAL PAIN: Primary | ICD-10-CM

## 2017-10-16 RX ORDER — LOSARTAN POTASSIUM 100 MG/1
TABLET ORAL
COMMUNITY
Start: 2017-10-14

## 2017-10-16 NOTE — PROGRESS NOTES
[address]  [address], [address]  [address]  Phone: [phone]  Fax: [phone]        Shaquille Garner  : 1929  PCP: Shanae Díaz MD  10/16/2017    PROGRESS NOTE      ASSESSMENT AND PLAN    Amado Langston comes in to the office today for a right L4 and L5 SNRB f/u. The procedure moderately improved her symptoms. At this time, we will discontinue the Gabapentin due to increased lower extremity swelling. If her radicular symptoms return, we may need to start on another neuromodulator. Her pain today is located at the right SI joint. We will manage the remainder of her pain with a course of PT. Pt will f/u in 6 weeks or sooner as needed. Diagnoses and all orders for this visit:    1. Myofascial pain  -     REFERRAL TO PHYSICAL THERAPY    2. Sacroiliac joint pain  -     REFERRAL TO PHYSICAL THERAPY         Follow-up Disposition:  Return in about 6 weeks (around 2017), or if symptoms worsen or fail to improve. HISTORY OF PRESENT ILLNESS  Amado Langston is a 80 y.o. female. A&P / HPI from 2017  Laura Farooq in to the office today c/o right-sided lumbar pain that is radiating into the lateral aspect of the right thigh.       Based upon the physical examination findings of a positive right straight leg raise, right AMERICA test, and distraction test, I am lead to believe her symptoms are caused by sacroiliitis.      She was referred for physical therapy and a right SI joint injection.           Updates from 17:  Pt presents for continued right-sided pain in the lumbar region that is radiating down the posterior aspect of the right lower extremity. At the last visit, she was referred to get a right SI joint injection, which did not improve her symptoms.  She was unable to complete the physical therapy due to the increase in symptoms.       Updates from 17:  Pt presents for a lumbar MRI f/u. Her pain today is current rated at an 4/10. The study showed significant evidence to prove lumbar radiculopathy. She notes that her symptoms have improved since the last visit.         Updates from 09/18/17:  Pt presents for right L4 and L5 SNRB f/u. She reports the injection did provide relief however, she continues to have radicular symptoms.        Updates from 10/16/17:  Pt presents for a right L4 and L5 SNRB f/u. Her pain today is rated at an intermittent aching 3/10. She reports the procedure moderately improved her symptoms. She denies radicular pains at this time. However, she continues to have pain located at the right low back and hip region. She notes increased swelling to the lower extremities with her current dose of Gabapentin. PAST MEDICAL HISTORY   Past Medical History:   Diagnosis Date    Arthritis     Chronic kidney disease     only has 1/2 left kidney    Diabetes ([facility] [address]     GERD (gastroesophageal reflux disease)     Gout     High cholesterol     Hypertension        Past Surgical History:   Procedure Laterality Date    HX UROLOGICAL      Kidney left    . MEDICATIONS      Current Outpatient Prescriptions   Medication Sig Dispense Refill    losartan (COZAAR) 100 mg tablet       gabapentin (NEURONTIN) 300 mg capsule Take 2 Caps by mouth three (3) times daily. 180 Cap 2    glimepiride (AMARYL) 2 mg tablet Take  by mouth every morning.  amLODIPine (NORVASC) 5 mg tablet Take 5 mg by mouth daily.  lisinopril (PRINIVIL, ZESTRIL) 20 mg tablet Take  by mouth daily.  simvastatin (ZOCOR) 20 mg tablet Take  by mouth nightly.  allopurinol (ZYLOPRIM) 100 mg tablet Take  by mouth daily.  calcium-cholecalciferol, D3, (CALTRATE 600+D) tablet Take 1 Tab by mouth two (2) times a day.  cholecalciferol, vitamin D3, (VITAMIN D3) 2,000 unit tab Take  by mouth.  colchicine 0.6 mg tablet Take 0.6 mg by mouth daily as needed.       omeprazole (PRILOSEC) 20 mg capsule Take 20 mg by mouth daily as needed.  HYDROCHLOROTHIAZIDE PO Take  by mouth daily. ALLERGIES    Allergies   Allergen Reactions    Aspirin Shortness of Breath          SOCIAL HISTORY    Social History     Social History    Marital status:      Spouse name: N/A    Number of children: N/A    Years of education: N/A     Social History Main Topics    Smoking status: Never Smoker    Smokeless tobacco: Never Used    Alcohol use No    Drug use: No    Sexual activity: Not on file     Other Topics Concern    Not on file     Social History Narrative       FAMILY HISTORY  No family history on file. REVIEW OF SYSTEMS  Review of Systems   Constitutional: Negative for chills, diaphoresis, fever, malaise/fatigue and weight loss. Respiratory: Negative for shortness of breath. Cardiovascular: Negative for chest pain and leg swelling. Gastrointestinal: Negative for constipation, nausea and vomiting. Neurological: Negative for dizziness, tingling, seizures, loss of consciousness, weakness and headaches. Psychiatric/Behavioral: The patient does not have insomnia. PHYSICAL EXAMINATION  Visit Vitals    /64    Pulse 87    Temp 97.6 °F (36.4 °C)    Resp 18    Ht 5' 3\" (1.6 m)    Wt 175 lb (79.4 kg)    SpO2 92%    BMI 31 kg/m2       Pain Assessment  8/22/2017   Location of Pain Back;Leg   Location Modifiers Right   Severity of Pain 7   Quality of Pain Aching   Duration of Pain Persistent   Frequency of Pain Constant   Aggravating Factors Standing;Walking   Limiting Behavior Some   Relieving Factors Nothing   Result of Injury No           Constitutional:  Well developed, well nourished, in no acute distress. Psychiatric: Affect and mood are appropriate. Integumentary: No rashes or abrasions noted on exposed areas. SPINE/MUSCULOSKELETAL EXAM    Cervical spine:  Neck is midline. Normal muscle tone. No focal atrophy is noted. ROM pain free.    Shoulder ROM intact. No tenderness to palpation. Negative Spurling's sign. Negative Tinel's sign. Negative Peralta's sign.       Sensation in the bilateral arms grossly intact to light touch.       Lumbar spine:  No rash, ecchymosis, or gross obliquity. No fasciculations. No focal atrophy is noted. No pain with hip ROM. Full range of motion. Tenderness to palpation. No tenderness to palpation at the sciatic notch. Right SI joint tender. Trochanters non tender. Positive right AMERICA test   Negative P4 test   Negative Compression test  Positive right Distraction test   Negative Gaenslen's test          Sensation in the bilateral legs grossly intact to light touch.      Updates from 08/08/17:  Positive right straight leg raise   Tenderness in the lumbar paraspinals       Updates from 08/22/17:  Decreased sensation at the right L4 dermatome   Positive right straight leg raise        MOTOR:      Biceps  Triceps Deltoids Wrist Ext Wrist Flex Hand Intrin   Right 5/5 5/5 5/5 5/5 5/5 5/5   Left 5/5 5/5 5/5 5/5 5/5 5/5             Hip Flex  Quads Hamstrings Ankle DF EHL Ankle PF   Right 5/5 5/5 5/5 5/5 5/5 5/5   Left 5/5 5/5 5/5 5/5 5/5 5/5     DTRs are 2+ biceps, triceps, brachioradialis, patella, and Achilles.      Positive right Straight Leg raise. Squat not tested. No difficulty with tandem gait.       Ambulation without assistive device. FWB.       RADIOGRAPHS  Lumbar MRI images taken on 08/09/2017 personally reviewed with patient:  FINDINGS: Grade 1anterolisthesis of L4 on L5 by approximately 8 mm. No  associated pars interarticularis defects. Normal vertebral body heights. No  suspicious marrow replacing lesions. The conus medullaris looks normal  terminating at the lower T12 level. Cauda equina nerve roots are unremarkable. Paravertebral soft tissues are unremarkable. Absent visualization of the left  kidney.      Spinal canal and neural foramen:       T12-L1: Minimal disc bulge.  No spinal canal or foraminal stenosis.      L1-2: Mild disc bulge. No spinal canal or foraminal stenosis.      L2-3: Mild disc bulge. Mild facet and ligamentum flavum hypertrophy. No spinal  canal stenosis. Mild to moderate right foraminal stenosis with no nerve root  impingement.      L3-4: Minimal disc bulge. 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 This  results in an enlarged combined L3-4 and L4-5 foraminal space which contains  both L3 and L4 nerve roots. There has been a left laminotomy. No spinal canal or  foraminal stenosis. No evidence of nerve root impingement.      L4-5: Disc is mildly narrowed and diffusely bulging. Markedly hypertrophic  facets containing a small amount of degenerative fluid. The disproportionate  marked facet arthropathy is likely due to the developmental abnormality of the  L4 posterior elements causing abnormal stress on the facet joint spaces. There  is also prominence of the dorsal epidural fat which is mildly impressing the  thecal sac. Overall severe concentric narrowing of the thecal sac and spinal  stenosis. Severe right foraminal stenosis with compressed L4 nerve root.      L5-S1: No significant degenerative disc disease. Mildly hypertrophic facets. No  spinal canal or foraminal stenosis.      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 There also appears to  be a prior left laminotomy defect. Developmental abnormality causes abnormal  stresses on the L4-5 facets which demonstrate marked arthropathy. The marked  arthropathy combined with prominence of dorsal epidural fat, anterolisthesis and  disc bulge causes severe spinal canal stenosis at L4-5.  The right neural foramen  is severely narrowed with compression of the L4 nerve root.      Lumbar XR images taken on 06/27/2017 personally reviewed with patient:  1) Stable L4-5 anterolisthesis and severe disc space narrowing  2) No obvious fractures       reviewed      Ms. Key has a reminder for a \"due or due soon\" health maintenance. I have asked that she contact her primary care provider for follow-up on this health maintenance.       This plan was reviewed with the patient and patient agrees. All questions were answered. More than half of this visit today was spent on counseling.      Written by Bran Fagan, as dictated by Dr. Gwen Lizarraga, Dr. Richelle Leon, confirm that all documentation is accurate.

## 2017-10-17 ENCOUNTER — HOSPITAL ENCOUNTER (EMERGENCY)
Age: 82
Discharge: HOME | End: 2017-10-18
Attending: EMERGENCY MEDICINE
Payer: MEDICARE

## 2017-10-17 DIAGNOSIS — R42 LIGHTHEADEDNESS: ICD-10-CM

## 2017-10-17 DIAGNOSIS — R73.9 HYPERGLYCEMIA: Primary | ICD-10-CM

## 2017-10-17 LAB
ALBUMIN SERPL-MCNC: 4.3 G/DL (ref 3.4–5)
ALBUMIN/GLOB SERPL: 1.1 {RATIO} (ref 0.8–1.7)
ALP SERPL-CCNC: 72 U/L (ref 45–117)
ALT SERPL-CCNC: 42 U/L (ref 13–56)
ANION GAP SERPL CALC-SCNC: 8 MMOL/L (ref 3–18)
AST SERPL-CCNC: 34 U/L (ref 15–37)
BASOPHILS # BLD: 0 K/UL (ref 0–0.1)
BASOPHILS NFR BLD: 0 % (ref 0–2)
BILIRUB SERPL-MCNC: 0.3 MG/DL (ref 0.2–1)
BUN SERPL-MCNC: 33 MG/DL (ref 7–18)
BUN/CREAT SERPL: 15 (ref 12–20)
CALCIUM SERPL-MCNC: 9.2 MG/DL (ref 8.5–10.1)
CHLORIDE SERPL-SCNC: 103 MMOL/L (ref 100–108)
CO2 SERPL-SCNC: 26 MMOL/L (ref 21–32)
CREAT SERPL-MCNC: 2.26 MG/DL (ref 0.6–1.3)
DIFFERENTIAL METHOD BLD: ABNORMAL
EOSINOPHIL # BLD: 0.2 K/UL (ref 0–0.4)
EOSINOPHIL NFR BLD: 4 % (ref 0–5)
ERYTHROCYTE [DISTWIDTH] IN BLOOD BY AUTOMATED COUNT: 14.1 % (ref 11.6–14.5)
GLOBULIN SER CALC-MCNC: 3.8 G/DL (ref 2–4)
GLUCOSE BLD STRIP.AUTO-MCNC: 340 MG/DL (ref 70–110)
GLUCOSE SERPL-MCNC: 344 MG/DL (ref 74–99)
HCT VFR BLD AUTO: 42.1 % (ref 35–45)
HGB BLD-MCNC: 14.1 G/DL (ref 12–16)
LYMPHOCYTES # BLD: 2.6 K/UL (ref 0.9–3.6)
LYMPHOCYTES NFR BLD: 49 % (ref 21–52)
MCH RBC QN AUTO: 28.6 PG (ref 24–34)
MCHC RBC AUTO-ENTMCNC: 33.5 G/DL (ref 31–37)
MCV RBC AUTO: 85.4 FL (ref 74–97)
MONOCYTES # BLD: 0.5 K/UL (ref 0.05–1.2)
MONOCYTES NFR BLD: 9 % (ref 3–10)
NEUTS SEG # BLD: 2 K/UL (ref 1.8–8)
NEUTS SEG NFR BLD: 38 % (ref 40–73)
PLATELET # BLD AUTO: 211 K/UL (ref 135–420)
PMV BLD AUTO: 12 FL (ref 9.2–11.8)
POTASSIUM SERPL-SCNC: 4.1 MMOL/L (ref 3.5–5.5)
PROT SERPL-MCNC: 8.1 G/DL (ref 6.4–8.2)
RBC # BLD AUTO: 4.93 M/UL (ref 4.2–5.3)
SODIUM SERPL-SCNC: 137 MMOL/L (ref 136–145)
WBC # BLD AUTO: 5.3 K/UL (ref 4.6–13.2)

## 2017-10-17 PROCEDURE — 99284 EMERGENCY DEPT VISIT MOD MDM: CPT

## 2017-10-17 PROCEDURE — 80053 COMPREHEN METABOLIC PANEL: CPT | Performed by: EMERGENCY MEDICINE

## 2017-10-17 PROCEDURE — 82962 GLUCOSE BLOOD TEST: CPT

## 2017-10-17 PROCEDURE — 85025 COMPLETE CBC W/AUTO DIFF WBC: CPT | Performed by: EMERGENCY MEDICINE

## 2017-10-17 PROCEDURE — 93005 ELECTROCARDIOGRAM TRACING: CPT

## 2017-10-18 VITALS
HEART RATE: 79 BPM | RESPIRATION RATE: 17 BRPM | HEIGHT: 63 IN | DIASTOLIC BLOOD PRESSURE: 80 MMHG | TEMPERATURE: 97.6 F | BODY MASS INDEX: 31.01 KG/M2 | OXYGEN SATURATION: 96 % | SYSTOLIC BLOOD PRESSURE: 165 MMHG | WEIGHT: 175 LBS

## 2017-10-18 LAB
ATRIAL RATE: 83 BPM
CALCULATED P AXIS, ECG09: 34 DEGREES
CALCULATED R AXIS, ECG10: -22 DEGREES
CALCULATED T AXIS, ECG11: 119 DEGREES
DIAGNOSIS, 93000: NORMAL
P-R INTERVAL, ECG05: 138 MS
Q-T INTERVAL, ECG07: 342 MS
QRS DURATION, ECG06: 76 MS
QTC CALCULATION (BEZET), ECG08: 401 MS
VENTRICULAR RATE, ECG03: 83 BPM

## 2017-10-18 PROCEDURE — 74011636637 HC RX REV CODE- 636/637: Performed by: EMERGENCY MEDICINE

## 2017-10-18 RX ADMIN — HUMAN INSULIN 6 UNITS: 100 INJECTION, SOLUTION SUBCUTANEOUS at 01:48

## 2017-10-18 NOTE — ED PROVIDER NOTES
HPI Comments: 1:53 AM      Keren Fitch is a 80 y.o. Female with PMHx of HTN, GERD, Diabetes presents to the ED with a chief complaint of hyperglycemia prior to arrival. Pt states she felt lightheaded today 2 hours ago and checked her blood glucose which read 440, so she came to the ED. Patient denies fever, chills, chest pain, cough, SOB, N/V/D, abdominal pain, dysuria, diaphoresis, syncope or any other symptoms or complaints. Patient is a 80 y.o. female presenting with hyperglycemia. The history is provided by the patient. No  was used. High Blood Sugar          Past Medical History:   Diagnosis Date    Arthritis     Chronic kidney disease     only has 1/2 left kidney    Diabetes ([address]     GERD (gastroesophageal reflux disease)     Gout     High cholesterol     Hypertension        Past Surgical History:   Procedure Laterality Date    HX UROLOGICAL      Kidney left          No family history on file. Social History     Social History    Marital status:      Spouse name: N/A    Number of children: N/A    Years of education: N/A     Occupational History    Not on file. Social History Main Topics    Smoking status: Never Smoker    Smokeless tobacco: Never Used    Alcohol use No    Drug use: No    Sexual activity: Not on file     Other Topics Concern    Not on file     Social History Narrative         ALLERGIES: Aspirin    Review of Systems   Constitutional: Negative for fever. Neurological: Positive for dizziness and light-headedness. All other systems reviewed and are negative. Vitals:    10/17/17 2134   BP: 156/86   Pulse: 86   Resp: 18   Temp: 97.6 °F (36.4 °C)   SpO2: 99%   Weight: 79.4 kg (175 lb)   Height: 5' 3\" (1.6 m)            Physical Exam   Constitutional: She is oriented to person, place, and time. She appears well-developed. HENT:   Head: Normocephalic and atraumatic.    Eyes: EOM are normal. Pupils are equal, round, and reactive to light.   Neck: Normal range of motion. Neck supple. Cardiovascular: Normal rate, regular rhythm and normal heart sounds. Exam reveals no friction rub. No murmur heard. Pulmonary/Chest: Effort normal and breath sounds normal. No respiratory distress. She has no wheezes. Abdominal: Soft. She exhibits no distension. There is no tenderness. There is no rebound and no guarding. Musculoskeletal: Normal range of motion. Neurological: She is alert and oriented to person, place, and time. Skin: Skin is warm and dry. Psychiatric: She has a normal mood and affect. Her behavior is normal. Thought content normal.        MDM  Number of Diagnoses or Management Options  Hyperglycemia:   Lightheadedness:   Diagnosis management comments: Lightheaded, feeling better, glucose elevated will tx. EKG reviewed. Not scanned, was part of downtime (computers down) was reviewed, ok to go. No cp, ha sob or abd pain. No s/sx of infeciton or infarction. ED Course       Procedures             Scribe Attestation      Giselle Juarez acting as a scribe for and in the presence of Chantal Harmon MD      October 18, 2017 at 1:55 AM       Provider Attestation:      I personally performed the services described in the documentation, reviewed the documentation, as recorded by the scribe in my presence, and it accurately and completely records my words and actions.  October 18, 2017 at 1:55 AM - Chantal Harmon MD

## 2017-10-18 NOTE — ED NOTES
I have reviewed discharge instructions with the patient. The patient verbalized understanding. Patient looks comfortable, left ED in stable condition. Ambulatory, steady gait noted. Vital signs stable at this time.

## 2017-10-18 NOTE — DISCHARGE INSTRUCTIONS
Dizziness: Care Instructions  Your Care Instructions  Dizziness is the feeling of unsteadiness or fuzziness in your head. It is different than having vertigo, which is a feeling that the room is spinning or that you are moving or falling. It is also different from lightheadedness, which is the feeling that you are about to faint. It can be hard to know what causes dizziness. Some people feel dizzy when they have migraine headaches. Sometimes bouts of flu can make you feel dizzy. Some medical conditions, such as heart problems or high blood pressure, can make you feel dizzy. Many medicines can cause dizziness, including medicines for high blood pressure, pain, or anxiety. If a medicine causes your symptoms, your doctor may recommend that you stop or change the medicine. If it is a problem with your heart, you may need medicine to help your heart work better. If there is no clear reason for your symptoms, your doctor may suggest watching and waiting for a while to see if the dizziness goes away on its own. Follow-up care is a key part of your treatment and safety. Be sure to make and go to all appointments, and call your doctor if you are having problems. It's also a good idea to know your test results and keep a list of the medicines you take. How can you care for yourself at home? · If your doctor recommends or prescribes medicine, take it exactly as directed. Call your doctor if you think you are having a problem with your medicine. · Do not drive while you feel dizzy. · Try to prevent falls. Steps you can take include:  ¨ Using nonskid mats, adding grab bars near the tub, and using night-lights. ¨ Clearing your home so that walkways are free of anything you might trip on. ¨ Letting family and friends know that you have been feeling dizzy. This will help them know how to help you. When should you call for help? Call 911 anytime you think you may need emergency care.  For example, call if:  · You passed out (lost consciousness). · You have dizziness along with symptoms of a heart attack. These may include:  ¨ Chest pain or pressure, or a strange feeling in the chest.  ¨ Sweating. ¨ Shortness of breath. ¨ Nausea or vomiting. ¨ Pain, pressure, or a strange feeling in the back, neck, jaw, or upper belly or in one or both shoulders or arms. ¨ Lightheadedness or sudden weakness. ¨ A fast or irregular heartbeat. · You have symptoms of a stroke. These may include:  ¨ Sudden numbness, tingling, weakness, or loss of movement in your face, arm, or leg, especially on only one side of your body. ¨ Sudden vision changes. ¨ Sudden trouble speaking. ¨ Sudden confusion or trouble understanding simple statements. ¨ Sudden problems with walking or balance. ¨ A sudden, severe headache that is different from past headaches. Call your doctor now or seek immediate medical care if:  · You feel dizzy and have a fever, headache, or ringing in your ears. · You have new or increased nausea and vomiting. · Your dizziness does not go away or comes back. Watch closely for changes in your health, and be sure to contact your doctor if:  · You do not get better as expected. Where can you learn more? Go to http://dominik-tereza.info/. Enter R125 in the search box to learn more about \"Dizziness: Care Instructions. \"  Current as of: March 20, 2017  Content Version: 11.3  © 8924-8950 [employer]. Care instructions adapted under license by [employer] (which disclaims liability or warranty for this information). If you have questions about a medical condition or this instruction, always ask your healthcare professional. [place] [address] any warranty or liability for your use of this information. Learning About High Blood Sugar  What is high blood sugar? Your body turns the food you eat into glucose (sugar), which it uses for energy.  But if your body isn't able to use the sugar right away, it can build up in your blood and lead to high blood sugar. When the amount of sugar in your blood stays too high for too much of the time, you may have diabetes. Diabetes is a disease that can cause serious health problems. The good news is that lifestyle changes may help you get your blood sugar back to normal and avoid or delay diabetes. What causes high blood sugar? Sugar (glucose) can build up in your blood if you:  · Are overweight. · Have a family history of diabetes. · Take certain medicines, such as steroids. What are the symptoms? Having high blood sugar may not cause any symptoms at all. Or it may make you feel very thirsty or very hungry. You may also urinate more often than usual, have blurry vision, or lose weight without trying. How is high blood sugar treated? You can take steps to lower your blood sugar level if you understand what makes it get higher. Your doctor may want you to learn how to test your blood sugar level at home. Then you can see how illness, stress, or different kinds of food or medicine raise or lower your blood sugar level. Other tests may be needed to see if you have diabetes. How can you prevent high blood sugar? · Watch your weight. If you're overweight, losing just a small amount of weight may help. Reducing fat around your waist is most important. · Limit the amount of calories, sweets, and unhealthy fat you eat. Ask your doctor if a dietitian can help you. A registered dietitian can help you create meal plans that fit your lifestyle. · Get at least 30 minutes of exercise on most days of the week. Exercise helps control your blood sugar. It also helps you maintain a healthy weight. Walking is a good choice. You also may want to do other activities, such as running, swimming, cycling, or playing tennis or team sports. · If your doctor prescribed medicines, take them exactly as prescribed.  Call your doctor if you think you are having a problem with your medicine. You will get more details on the specific medicines your doctor prescribes. Follow-up care is a key part of your treatment and safety. Be sure to make and go to all appointments, and call your doctor if you are having problems. It's also a good idea to know your test results and keep a list of the medicines you take. Where can you learn more? Go to http://dominik-tereza.info/. Enter O108 in the search box to learn more about \"Learning About High Blood Sugar. \"  Current as of: March 13, 2017  Content Version: 11.3  © 1560-8848 [employer]. Care instructions adapted under license by [employer] (which disclaims liability or warranty for this information). If you have questions about a medical condition or this instruction, always ask your healthcare professional. [address] any warranty or liability for your use of this information. Lightheadedness or Faintness: Care Instructions  Your Care Instructions  Lightheadedness is a feeling that you are about to faint or \"pass out. \" You do not feel as if you or your surroundings are moving. It is different from vertigo, which is the feeling that you or things around you are spinning or tilting. Lightheadedness usually goes away or gets better when you lie down. If lightheadedness gets worse, it can lead to a fainting spell. It is common to feel lightheaded from time to time. Lightheadedness usually is not caused by a serious problem. It often is caused by a short-lasting drop in blood pressure and blood flow to your head that occurs when you get up too quickly from a seated or lying position. Follow-up care is a key part of your treatment and safety. Be sure to make and go to all appointments, and call your doctor if you are having problems. It's also a good idea to know your test results and keep a list of the medicines you take.   How can you care for yourself at home?  · Lie down for 1 or 2 minutes when you feel lightheaded. After lying down, sit up slowly and remain sitting for 1 to 2 minutes before slowly standing up. · Avoid movements, positions, or activities that have made you lightheaded in the past.  · Get plenty of rest, especially if you have a cold or flu, which can cause lightheadedness. · Make sure you drink plenty of fluids, especially if you have a fever or have been sweating. · Do not drive or put yourself and others in danger while you feel lightheaded. When should you call for help? Call 911 anytime you think you may need emergency care. For example, call if:  · You have symptoms of a stroke. These may include:  ¨ Sudden numbness, tingling, weakness, or loss of movement in your face, arm, or leg, especially on only one side of your body. ¨ Sudden vision changes. ¨ Sudden trouble speaking. ¨ Sudden confusion or trouble understanding simple statements. ¨ Sudden problems with walking or balance. ¨ A sudden, severe headache that is different from past headaches. · You have symptoms of a heart attack. These may include:  ¨ Chest pain or pressure, or a strange feeling in the chest.  ¨ Sweating. ¨ Shortness of breath. ¨ Nausea or vomiting. ¨ Pain, pressure, or a strange feeling in the back, neck, jaw, or upper belly or in one or both shoulders or arms. ¨ Lightheadedness or sudden weakness. ¨ A fast or irregular heartbeat. After you call 911, the  may tell you to chew 1 adult-strength or 2 to 4 low-dose aspirin. Wait for an ambulance. Do not try to drive yourself. Watch closely for changes in your health, and be sure to contact your doctor if:  · Your lightheadedness gets worse or does not get better with home care. Where can you learn more? Go to http://dominik-tereza.info/. Enter N328 in the search box to learn more about \"Lightheadedness or Faintness: Care Instructions. \"  Current as of: March 20, 2017  Content Version: 11.3  © 2910-0905 [employer]. Care instructions adapted under license by [employer] (which disclaims liability or warranty for this information). If you have questions about a medical condition or this instruction, always ask your healthcare professional. [address] any warranty or liability for your use of this information.

## 2019-12-26 ENCOUNTER — HOSPITAL ENCOUNTER (OUTPATIENT)
Dept: PHYSICAL THERAPY | Age: 84
Discharge: HOME | End: 2019-12-26
Payer: MEDICARE

## 2019-12-26 PROCEDURE — 97161 PT EVAL LOW COMPLEX 20 MIN: CPT

## 2019-12-26 PROCEDURE — 97110 THERAPEUTIC EXERCISES: CPT

## 2019-12-26 PROCEDURE — 97530 THERAPEUTIC ACTIVITIES: CPT

## 2019-12-26 NOTE — PROGRESS NOTES
PT DAILY TREATMENT NOTE/NEURO EVAL 10-18    Patient Name: Niya Jay  Date:2019  : 1929  [x]  Patient  Verified  Payor: [place] MEDICARE / Plan: [place] MEDICARE PART A & B / Product Type: Medicare /    In time:915  Out time:957  Total Treatment Time (min): 42  Visit #: 1 of 12    Medicare/BCBS Only   Total Timed Codes (min):  25 1:1 Treatment Time:  25     Treatment Area: Dizziness and giddiness [R42]    SUBJECTIVE  Pain Level (0-10 scale): 0      Reports 10/10 pain in the morning and intermittent pains as well during the day on her right side and it can happen when she is walking  []constant [x]intermittent []improving []worsening []no change since onset    Any medication changes, allergies to medications, adverse drug reactions, diagnosis change, or new procedure performed?: [x] No    [] Yes (see summary sheet for update)  Subjective functional status/changes:     PLOF: I all areas of ADLS and activities, no AD use. Tolerated household and community activities without any difficulty   Limitations to PLOF:dizziness  Mechanism of Injury: 2-3 weeks ago, insideous onset  Current symptoms/Complaints: 79 YO female diagnosed as above and with S/S consistent with above diagnosis presents to skilled outpatient PT. CCO dizziness that began approximately 2-3 weeks ago- insideous onset. She denies any falls , loss of consciousness or hitting her head. Overall she states it is staying about the same. Previous Treatment/Compliance: PCP only  PMHx/Surgical Hx: osteoporosis, arthritis, DM, HTN, visual impaired. Work Hx: retired   Living Situation: lives in a 1 story house, not alone- son lives with her.    Pt Goals: stop the dizziness  Barriers: []pain []financial []time []transportation []other  Motivation: good  Substance use: []Alcohol []Tobacco []other:   FABQ Score: []low []elevate  Cognition: A & O x 4   Other:    OBJECTIVE/EXAMINATION  Domestic Life: household and community activities  Activity/Recreational Limitations: dizziness  Mobility: since this recently began she reports using a rollator walker as needed in the community. Self Care   I        Sit to stands no UE use 8 in 30 seconds    22 min [x]Eval                  []Re-Eval       15 min Therapeutic Exercise:  [x] See flow sheet :   Rationale: increase ROM, increase strength, improve coordination, improve balance and increase proprioception to improve the patients ability to tolerate ADLS and activities    10 min Therapeutic Activity:  [x]  See flow sheet :   Rationale: increase ROM, increase strength, improve coordination, improve balance and increase proprioception  to improve the patients ability to tolerate ADLs and activities            With   [x] TE   [x] TA   [] neuro   [] other: Patient Education: [x] Review HEP    [] Progressed/Changed HEP based on:   [] positioning   [] body mechanics   [] transfers   [] heat/ice application    [x] other: POC,      Other Objective/Functional Measures: FALLS RISK is low. Reports a worry of falling since this dizziness started. She reports at times she has to reach out and touch the wall to catch herself but that she has not had any falls.      Physical Therapy Evaluation  Neurologic    Posture: [] Poor    [] Fair    [x] Good    Describe:       Gait: [] Normal    [x] Abnormal    Device:    None at evaluation, notes recent use of rollator since this dizziness began  Describe: reports reciprocal pattern, fluctuations in pace,     ROM:          Assessed in sitting                          AROM    PROM   Shoulder Left Right Left Right   Flex       Ext       ABD       ER       IR                AROM    PROM   Knee Left Right Left Right   Ext -12 -12     Flex 120 115               AROM                           PROM  Hip Left Right Left Right   Flex 15 15     Ext       ABD/ADD WFL/WFL WFL/WFL     ER       IR                                                AROM      PROM   Ankle Left Right Left Right   PF [facility]     DF [facility] WFL       Strength (MMT):  Shoulder L (1-5) R (1-5)   Shoulder Flexion     Shoulder Ext     Shoulder ABD     Shoulder ADD     Shoulder IR     Shoulder ER                                               Hip L (1-5) R (1-5)   Hip Flexion 4 4   Hip Ext     Hip ABD 4+ 4+   Hip ADD 4+ 4+   Hip ER     Hip IR       Knee L (1-5) R (1-5)   Knee Flexion 4 4   Knee Extension 4+ 4+   Ankle PF     Ankle DF     Other       Tone: normal    Motor Control: normal    Sensation:  intact   Reflexes: [x] Not Tested   Left Right   Biceps (C5)     Brachioradiais (C6)     Triceps (C7)     Knee Jerk (L4)     Ankle Jerk (SI)       Balance/ Equilibrium:              Left            Right  Tracks Across Midline  X X   Reaches Across Midline X X         Sitting Balance: Static:  [x] Good    [] Fair    [] Poor     Dynamic:   [x] Good    [] Fair    [] Poor        Standing Balance: Static:   [x] Good    [x] Fair    [] Poor     Dynamic:   [x] Good    [x] Fair    [] Poor        Protective Extension:  [] Present    [] Delayed    [] Absent        Single Leg Stance:         Eyes Open  Eyes Closed   L 27 sec  intermittent touches on table L 22 sec intermittent touches on the table .    R 60 sec intermittent touches on table, test stopped by rebecca R 33 sec intermittent touches on table        Functional Mobility      Bed Mobility:      Scooting:  Reports I       Rolling: Reports I       Sit-Supine: Reports I      Transfers:       Sit-Stand: I        Floor-Stand: NA      Gait:       Tandem:       Backwards:       Braiding:      Elevations:       Curbs:      Ramps:      Stairs: reciprocal pattern reported    Behavior: [x] Cooperative    [] Impulsive    [] Agitated    [] Perseverative    [] Confused   Oriented x:4    Cognition: [] One Step Commands   [x] Multiple Commands   [] Displays Neglect [] R  [] L    Other:       Impaired Judgement: [] Y    [x] N      Impaired Vision:  [x] Y    [] N      Safety Awareness Deficits  [] Y    [x] N      Impaired Hearing  [] Y    [x] N      Able to Express Needs [x] Y    [] N    Optional Tests:       Dynamic Gait Index (24pt scale): Functional Gait Assessment (30pt scale):       Coronel Balance Scale (56pt scale): Other test /comments:       Pain Level (0-10 scale) post treatment: 0     ASSESSMENT/Changes in Function: Patient demonstrates the potential to make gains with improved ROM, strength, endurance/activity tolerance, functional FOTO survey score   and all within a reasonable time frame so as to increase their functional independence with ADLs and activities for carryover to  Improved quality of life, tolerance to household and community activities, safety and I  with functional mobility and falls preventions. Patient requires skilled Physical Therapy so as to monitor their response to and modify their treatment plan accordingly. Patient appears to be an appropriate candidate for skilled outpatient Physical Therapy. Patient will continue to benefit from skilled PT services to modify and progress therapeutic interventions, address functional mobility deficits, address ROM deficits, address strength deficits, analyze and address soft tissue restrictions, analyze and cue movement patterns, analyze and modify body mechanics/ergonomics, assess and modify postural abnormalities, address imbalance/dizziness and instruct in home and community integration to attain remaining goals.      [x]  See Plan of Care  []  See progress note/recertification  []  See Discharge Summary         Progress towards goals / Updated goals:       PLAN  [x]  Upgrade activities as tolerated     [x]  Continue plan of care  []  Update interventions per flow sheet       []  Discharge due to:_  []  Other:_      Isaias Quiroz, PT 12/26/2019  9:16 AM

## 2019-12-26 NOTE — PROGRESS NOTES
[facility] [address], [address] [place] [address], [facility] [address]  [phone] fax      Plan of Care/ Statement of Necessity for Physical Therapy Services    Patient name: Luisa Miller Start of Care: 2019   Referral source: Jaci Fernandez MD : 1929    Medical Diagnosis: Dizziness and giddiness [R42]  Payor: VA MEDICARE / Plan: VA MEDICARE PART A & B / Product Type: Medicare /  Onset Date:2-3 weeks    Treatment Diagnosis: gait abnormality, dizziness   Prior Hospitalization: see medical history Provider#: [id number]   Medications: Verified on Patient summary List    Comorbidities: osteoporosis, arthritis, DM, HTN, visual impaired   Prior Level of Function: I all areas of ADLS and activities, no AD use. Tolerated household and community activities without any difficulty       The Plan of Care and following information is based on the information from the initial evaluation. Assessment/ key information: 79 YO female diagnosed as above and with S/S consistent with above diagnosis presents to skilled outpatient PT. CCO dizziness that began approximately 2-3 weeks ago- insideous onset. She denies any falls , loss of consciousness or hitting her head. Overall she states it is staying about the same. She has decreased ROM, strength B LEs, decreased static/dynamic standing balance. Patient demonstrates the potential to make gains with improved ROM, strength, endurance/activity tolerance, functional FOTO survey score   and all within a reasonable time frame so as to increase their functional independence with ADLs and activities for carryover to  Improved quality of life, tolerance to household and community activities, safety and I  with functional mobility and falls preventions. Patient requires skilled Physical Therapy so as to monitor their response to and modify their treatment plan accordingly.  Patient appears to be an appropriate candidate for skilled outpatient Physical Therapy  Evaluation Complexity History MEDIUM  Complexity : 1-2 comorbidities / personal factors will impact the outcome/ POC ; Examination MEDIUM Complexity : 3 Standardized tests and measures addressing body structure, function, activity limitation and / or participation in recreation  ;Presentation LOW Complexity : Stable, uncomplicated  ;Clinical Decision Making MEDIUM Complexity : FOTO score of 26-74  Overall Complexity Rating: LOW   Problem List: pain affecting function, decrease ROM, decrease strength, impaired gait/ balance, decrease ADL/ functional abilitiies, decrease activity tolerance, decrease flexibility/ joint mobility and other FOTO 38   Treatment Plan may include any combination of the following: Therapeutic exercise, Therapeutic activities, Neuromuscular re-education, Gait/balance training, Manual therapy, Patient education, Self Care training, Functional mobility training, Home safety training and Stair training  Patient / Family readiness to learn indicated by: asking questions, trying to perform skills and interest  Persons(s) to be included in education: patient (P)  Barriers to Learning/Limitations: None  Patient Goal (s): stop the dizziness  Patient Self Reported Health Status: fair  Rehabilitation Potential: good    Short Term Goals: To be accomplished in 5 treatments:   1 patient will have established and be I with HEP to aid with self management at discharge   EVAL issued HEP   CURRENT   2 patient will tolerate 30-45 minutes skilled Physical Therapy to address dizziness and falls prevention for improved safety with functional mobility at home and in the community   EVAL dizziness at times causes LOB when walking   CURRENT  Long Term Goals:  To be accomplished in 12 treatments:   1 patient will have FOTO 56 to show increase tolerance to moderate activities    EVAL 38, limited a lot   CURRENT   2 patient will have MMT B 5/5 to aid with increase stability with ADLs and functional mobility    EVAL hip F 4, abd/add 4+, knee F 4 and knee E 4+ all B   CURRENT   3 patient will demonstrate 570 feet ambulation including dynamic/obstacle course activities with no  LOB for carryover to improved safety at home and with community ambulation   EVAL dizziness reported with intermittent LOB. CURRENT   Frequency / Duration: Patient to be seen 2-3 times per week for 12 treatments. Patient/ Caregiver education and instruction: Diagnosis, prognosis, self care, activity modification and exercises   [x]  Plan of care has been reviewed with PTA    Certification Period: 12/26/19 - 1/24/20  Mary Jauregui, PT 12/26/2019 9:47 AM    ________________________________________________________________________    I certify that the above Therapy Services are being furnished while the patient is under my care. I agree with the treatment plan and certify that this therapy is necessary.     Physician's Signature:____________Date:_________TIME:________    [employer], Date and Time must be completed for valid certification **    Please sign and return to [facility] [address], [address] [place] [address],[address]  [phone] fax

## 2020-01-03 ENCOUNTER — HOSPITAL ENCOUNTER (OUTPATIENT)
Dept: PHYSICAL THERAPY | Age: 85
Discharge: HOME | End: 2020-01-03
Payer: MEDICARE

## 2020-01-03 PROCEDURE — 97112 NEUROMUSCULAR REEDUCATION: CPT

## 2020-01-03 PROCEDURE — 97530 THERAPEUTIC ACTIVITIES: CPT

## 2020-01-03 NOTE — PROGRESS NOTES
PT DAILY TREATMENT NOTE 10-18    Patient Name: Keren Fitch  Date:1/3/2020  : 1929  [x]  Patient  Verified  Payor: VA MEDICARE / Plan: VA MEDICARE PART A & B / Product Type: Medicare /    In time:955  Out time:1035  Total Treatment Time (min): 40  Visit #: 2 of 12    Medicare/BCBS Only   Total Timed Codes (min):  40 1:1 Treatment Time:  25       Treatment Area: Dizziness and giddiness [R42]    SUBJECTIVE  Pain Level (0-10 scale): dizziness 8  Any medication changes, allergies to medications, adverse drug reactions, diagnosis change, or new procedure performed?: [x] No    [] Yes (see summary sheet for update)  Subjective functional status/changes:   [] No changes reported  Pt reports she gets dizziness with walking. She has been doing HEP. OBJECTIVE      20 min Therapeutic Exercise:  [x] See flow sheet :   Rationale: increase ROM and increase strength to improve the patients ability to complete functional activities. 10 min Therapeutic Activity:  [x]  See flow sheet :   Rationale: increase ROM, increase strength, improve coordination, increase proprioception and sit to stand transfers  to improve the patients power and endurance to get out of chair safely. 15 min Neuromuscular Re-education:  [x]  See flow sheet :   Rationale: vestibular rehab  to improve the patients ability to ambulate with decreased dizziness    5 min Gait Training:  _  150 ft no AD SBA for safety with pt report of decreased dizziness to 6/10   Rationale: to improve patient's ability to get around safely with decreased dizziness to prevent LOB          With   [x] TE   [x] TA   [x] neuro   [x] other: Patient Education: [x] Review HEP    [] Progressed/Changed HEP based on:   [] positioning   [] body mechanics   [] transfers   [] heat/ice application    [] other:      Other Objective/Functional Measures:   Access Code: [email or web address]   URL: [email or web address]   Date: 2020   Prepared by: Martínez Lemus Seated Gaze Stabilization with Head Rotation - 10 reps - 3 sets - 1x daily - 7x weekly   Seated Gaze Stabilization with Head Nod - 10 reps - 3 sets - 1x daily - 7x weekly      Pain Level (0-10 scale) post treatment: dizziness 4/10    ASSESSMENT/Changes in Function: Added VOR x1 in to address dizziness with good return demo. Written hardcopy was printed for pt to complete daily; pt verbalized understanding. Tech helped to supervise some of the exercises. Verbal cues and demonstration required to use correct body mechanics. Progressed strengthening and balance exercises to improve patient's ability to complete functional task with good tolerance. Encouraged pt to complete HEP daily to help to continue to progress PT interventions to improve patient's ability to complete functional and community task independently. At the conclusion of the session, pt reported decreased dizziness after activity. Patient will continue to benefit from skilled PT services to modify and progress therapeutic interventions, address functional mobility deficits, address ROM deficits, address strength deficits, analyze and address soft tissue restrictions, analyze and cue movement patterns, analyze and modify body mechanics/ergonomics, assess and modify postural abnormalities and address imbalance/dizziness to attain remaining goals. [x]  See Plan of Care  []  See progress note/recertification  []  See Discharge Summary         Progress towards goals / Updated goals:  Short Term Goals:  To be accomplished in 5 treatments:              1 patient will have established and be I with HEP to aid with self management at discharge              EVAL issued HEP              CURRENT completing 1/3              2 patient will tolerate 30-45 minutes skilled Physical Therapy to address dizziness and falls prevention for improved safety with functional mobility at home and in the community              EVAL dizziness at times causes LOB when walking              CURRENT  Long Term Goals: To be accomplished in 12 treatments:              1 patient will have FOTO 56 to show increase tolerance to moderate activities               EVAL 38, limited a lot              CURRENT              2 patient will have MMT B 5/5 to aid with increase stability with ADLs and functional mobility               EVAL hip F 4, abd/add 4+, knee F 4 and knee E 4+ all B              CURRENT              3 patient will demonstrate 570 feet ambulation including dynamic/obstacle course activities with no  LOB for carryover to improved safety at home and with community ambulation              EVAL dizziness reported with intermittent LOB.                CURRENT     PLAN  [x]  Upgrade activities as tolerated     [x]  Continue plan of care  [x]  Update interventions per flow sheet       []  Discharge due to:_  []  Other:_      Kemal Mena 1/3/2020  10:00 AM    Future Appointments   Date Time Provider [place]   1/7/2020  9:30 AM Vini Mendoza PT [facility]   1/9/2020 11:30 AM Tiffanie Presser [facility]   1/14/2020  9:00 AM Shaan Hernandez PT [facility]   1/16/2020 10:00 AM Tiffanie Presser [facility]   1/21/2020  9:30 AM Vini Mendoza PT [facility]   1/23/2020 10:00 AM Tiffanie Presser [facility]   1/28/2020 10:00 AM Vini Mendoza PT [facility]   1/30/2020 10:00 AM Tiffanie Presser [facility]

## 2020-01-07 ENCOUNTER — HOSPITAL ENCOUNTER (OUTPATIENT)
Dept: PHYSICAL THERAPY | Age: 85
Discharge: HOME | End: 2020-01-07
Payer: MEDICARE

## 2020-01-07 PROCEDURE — 97112 NEUROMUSCULAR REEDUCATION: CPT

## 2020-01-07 PROCEDURE — 97110 THERAPEUTIC EXERCISES: CPT

## 2020-01-07 NOTE — PROGRESS NOTES
PT DAILY TREATMENT NOTE 10-18    Patient Name: Jarod Silva  Date:2020  : 1929  [x]  Patient  Verified  Payor: [place] MED[place]ARE / Plan: [place] MEDICARE PART A & B / Product Type: Medicare /    In time:928  Out time:1007  Total Treatment Time (min): 39  Visit #: 3 of 12    Medicare/BCBS Only   Total Timed Codes (min):  39 1:1 Treatment Time:  39       Treatment Area: Dizziness and giddiness [R42]    SUBJECTIVE  Pain Level (0-10 scale): dizziness , no pain  Any medication changes, allergies to medications, adverse drug reactions, diagnosis change, or new procedure performed?: [x] No    [] Yes (see summary sheet for update)  Subjective functional status/changes:   [] No changes reported  \"I have no pain, just ongoing dizziness today it is a 7. I still do my own cooking and cleaning and sometime the dizziness is a 10, and I feel like I'm going to fall. When I feel like that I get somewhere and sit down. \"    OBJECTIVE    Modality rationale:  to improve the patients ability to    Min Type Additional Details    [] Estim:  []Unatt       []IFC  []Premod                        []Other:  []w/ice   []w/heat  Position:  Location:    [] Estim: []Att    []TENS instruct  []NMES                    []Other:  []w/US   []w/ice   []w/heat  Position:  Location:    []  Traction: [] Cervical       []Lumbar                       [] Prone          []Supine                       []Intermittent   []Continuous Lbs:  [] before manual  [] after manual    []  Ultrasound: []Continuous   [] Pulsed                           []1MHz   []3MHz W/cm2:  Location:    []  Iontophoresis with dexamethasone         Location: [] Take home patch   [] In clinic    []  Ice     []  heat  []  Ice massage  []  Laser   []  Anodyne Position:  Location:    []  Laser with stim  []  Other:  Position:  Location:    []  Vasopneumatic Device Pressure:       [] lo [] med [] hi   Temperature: [] lo [] med [] hi   [] Skin assessment post-treatment:  []intact []redness- no adverse reaction    []redness  adverse reaction:      min []Eval                  []Re-Eval       31 min Therapeutic Exercise:  [] See flow sheet :   Rationale: increase ROM and increase strength to improve the patients ability to tolerate ADLs and activities. min Therapeutic Activity:  []  See flow sheet :   Rationale:   to improve the patients ability to      8 min Neuromuscular Re-education:  []  See flow sheet :   Rationale: improve coordination, improve balance and increase proprioception  to improve the patients ability to ADLs and activities. min Manual Therapy:     Rationale:  to      min Gait Training:  ___ feet with ___ device on level surfaces with ___ level of assist   Rationale: With   [x] TE   [] TA   [x] neuro   [x] other: Patient Education: [x] Review HEP    [] Progressed/Changed HEP based on:   [] positioning   [] body mechanics   [] transfers   [] heat/ice application    [x] other: Educated on safety and fall prevention. Other Objective/Functional Measures: VEC for exercies and techniques. Added SLS 2x15 sec (eo/ec). Pain Level (0-10 scale) post treatment: dizziness 2    ASSESSMENT/Changes in Function: Progressing towards goals. Required VC and demo for proper form and execution of exercises to prevent substitutions. Ambulated ~150 no dizziness, no LOB. Reports improvement as a result of physical therapy. However patient continues with episode of dizziness when doing household activities elevating up to 10 at times. Patient education on safety and fall prevention and encouraged to take a seat at the onset of dizziness. Patient will continue to benefit from skilled PT services to modify and progress therapeutic interventions, address functional mobility deficits, address strength deficits, address imbalance/dizziness and instruct in home and community integration to attain remaining goals.      [x]  See Plan of Care  []  See progress note/recertification  []  See Discharge Summary         Progress towards goals / Updated goals:  Short Term Goals: To be accomplished in 5 treatments:              1 patient will have established and be I with HEP to aid with self management at discharge              EVAL issued HEP              CURRENT completing 1/3, 1/7/20              2 patient will tolerate 30-45 minutes skilled Physical Therapy to address dizziness and falls prevention for improved safety with functional mobility at home and in the community              EVAL dizziness at times causes LOB when walking              CURRENT ambulated ~150 ft no dizziness/LOB reported 1/7/20  Long Term Goals: To be accomplished in 12 treatments:              1 patient will have FOTO 56 to show increase tolerance to moderate activities               EVAL 38, limited a lot              CURRENT              2 patient will have MMT B 5/5 to aid with increase stability with ADLs and functional mobility               EVAL hip F 4, abd/add 4+, knee F 4 and knee E 4+ all B              CURRENT              3 patient will demonstrate 570 feet ambulation including dynamic/obstacle course activities with no  LOB for carryover to improved safety at home and with community ambulation              EVAL dizziness reported with intermittent LOB.                [id number] ambulated ~150 ft no dizziness/LOB reported 1/7/20    PLAN  [x]  Upgrade activities as tolerated     [x]  Continue plan of care  []  Update interventions per flow sheet       []  Discharge due to:_  []  Other:_      Wyatt Ayala 1/7/2020  9:32 AM    Future Appointments   Date Time Provider [place]   1/9/2020 11:30 AM Fito Nunez [facility]   1/14/2020  9:00 AM Yu Michel PT [facility]   1/16/2020 10:00 AM Costa Helms [facility]   1/21/2020  9:30 AM Marlon Wayne PT [facility]   1/23/2020 10:00 AM Costa Helms [facility]   1/28/2020 10:00 AM Marlon Wayne PT [facility] 1/30/2020 10:00 AM Lucretia Rojas [facility]

## 2020-01-09 ENCOUNTER — HOSPITAL ENCOUNTER (OUTPATIENT)
Dept: PHYSICAL THERAPY | Age: 85
Discharge: HOME | End: 2020-01-09
Payer: MEDICARE

## 2020-01-09 PROCEDURE — 97110 THERAPEUTIC EXERCISES: CPT

## 2020-01-09 PROCEDURE — 97112 NEUROMUSCULAR REEDUCATION: CPT

## 2020-01-09 NOTE — PROGRESS NOTES
PT DAILY TREATMENT NOTE 10-18    Patient Name: Janell Escobedo  Date:2020  : 1929  [x]  Patient  Verified  Payor: [place] MEDICARE / Plan: [place] MEDICARE PART A & B / Product Type: Medicare /    In time:1134  Out time:1209  Total Treatment Time (min): 35  Visit #: 4 of 12    Medicare/BCBS Only   Total Timed Codes (min):  7 1:1 Treatment Time:  28       Treatment Area: Dizziness and giddiness [R42]    SUBJECTIVE  Pain Level (0-10 scale): dizzy  Any medication changes, allergies to medications, adverse drug reactions, diagnosis change, or new procedure performed?: [x] No    [] Yes (see summary sheet for update)  Subjective functional status/changes:   [] No changes reported  Pt reports dizziness not lasting as long. She states first thing in the morning she is the dizziest. She reports she keeps a glass of water close by and drinks it. HEP is being completed. OBJECTIVE    10 min Therapeutic Exercise:  [x] See flow sheet :   Rationale: increase ROM and increase strength to improve the patients ability to complete functional activities. 10 min Therapeutic Activity:  [x]  See flow sheet :   Rationale: increase ROM, increase strength, improve coordination, increase proprioception and stair training  to improve the patients ability to complete household chores. 15 min Neuromuscular Re-education:  [x]  See flow sheet :   Rationale: increase strength, improve coordination, improve balance and increase proprioception to improve the patients sensory and motor balance strategies to decrease risk of falls. With   [x] TE   [x] TA   [x] neuro   [] other: Patient Education: [x] Review HEP    [] Progressed/Changed HEP based on:   [] positioning   [] body mechanics   [] transfers   [] heat/ice application    [] other:      Other Objective/Functional Measures:      Pain Level (0-10 scale) post treatment: \"better\"    ASSESSMENT/Changes in Function: Tech helped to supervise some of the exercises. Progressed balance exercises to challenge pt's sensory system with pt report of increased challenge with good tolerance. Pt requires SBA-CGA occ for stability. She still requires 1 finger to prevent LOB. Added stair training to increase LE strength and endurance with good return demo on 4\" step. Verbal cues and demonstration required to use correct body mechanics. Encouraged pt to complete HEP daily to help to continue to progress PT interventions to improve patient's ability to complete functional and community task independently. At the conclusion of the session, pt reported decreased dizziness. Patient will continue to benefit from skilled PT services to modify and progress therapeutic interventions, address functional mobility deficits, address ROM deficits, address strength deficits, analyze and address soft tissue restrictions, analyze and cue movement patterns, analyze and modify body mechanics/ergonomics, assess and modify postural abnormalities and address imbalance/dizziness to attain remaining goals.      [x]  See Plan of Care  []  See progress note/recertification  []  See Discharge Summary         Progress towards goals / Updated goals:  Short Term Goals: To be accomplished in 5 treatments:              1 patient will have established and be I with HEP to aid with self management at discharge              EVAL issued HEP              CURRENT completing 1/3, 1/7/20 1/9              2 patient will tolerate 30-45 minutes skilled Physical Therapy to address dizziness and falls prevention for improved safety with functional mobility at home and in the community              EVAL dizziness at times causes LOB when walking              CURRENT ambulated ~150 ft no dizziness/LOB reported 1/7/20 35 min 1/9   Long Term Goals: To be accomplished in 12 treatments:              1 patient will have FOTO 56 to show increase tolerance to moderate activities               EVAL 38, limited a lot              CURRENT              2 patient will have MMT B 5/5 to aid with increase stability with ADLs and functional mobility               EVAL hip F 4, abd/add 4+, knee F 4 and knee E 4+ all B              CURRENT              3 patient will demonstrate 570 feet ambulation including dynamic/obstacle course activities with no  LOB for carryover to improved safety at home and with community ambulation              EVAL dizziness reported with intermittent LOB.                CURRENT ambulated ~150 ft no dizziness/LOB reported 1/7/20    PLAN  [x]  Upgrade activities as tolerated     [x]  Continue plan of care  [x]  Update interventions per flow sheet       []  Discharge due to:_  []  Other:_      Carolyn Tillman 1/9/2020  11:50 AM    Future Appointments   Date Time Provider Nick [place]   1[facility]14/2020  9:00 [facility] [address]   1/16/2020 10:00 AM [facility] [address]   1/21/2020  9:30 AM Marlon Wayne PT [facility] [address]   1/23/2020 10:00 AM Lacrbeka Helms [facility] [address]   1/28/2020 10:00 AM Marlon Wayne PT [facility] [address]   1/30/2020 10:00 AM [facility] [address]

## 2020-01-14 ENCOUNTER — HOSPITAL ENCOUNTER (OUTPATIENT)
Dept: PHYSICAL THERAPY | Age: 85
Discharge: HOME | End: 2020-01-14
Payer: MEDICARE

## 2020-01-14 PROCEDURE — 97110 THERAPEUTIC EXERCISES: CPT

## 2020-01-14 PROCEDURE — 97112 NEUROMUSCULAR REEDUCATION: CPT

## 2020-01-14 NOTE — PROGRESS NOTES
PT DAILY TREATMENT NOTE 10-18    Patient Name: Rosalina Dial  Date:2020  : 1929  [x]  Patient  Verified  Payor: [place] MEDICARE / Plan: [place] MEDICARE PART A & B / Product Type: Medicare /    In time:9:03  Out time:9:35  Total Treatment Time (min): 32  Visit #: 5 of 12    Medicare/BCBS Only   Total Timed Codes (min):  32 1:1 Treatment Time:  23       Treatment Area: Dizziness and giddiness [R42]    SUBJECTIVE  Pain Level (0-10 scale): 7/10- dizziness  Any medication changes, allergies to medications, adverse drug reactions, diagnosis change, or new procedure performed?: [x] No    [] Yes (see summary sheet for update)  Subjective functional status/changes:   [] No changes reported  Patient describes dizziness as feeling unsteady. Patient denies feeling light headed or like she is going to pass out. OBJECTIVE    8 min Therapeutic Exercise:  [x] See flow sheet :   Rationale: increase ROM and increase strength to improve the patients ability to perform ADLs. 24 min Neuromuscular Re-education:  [x]  See flow sheet :   Rationale: increase strength, improve coordination, improve balance and increase proprioception  to improve the patients ability to ambulate in community safely and on varying terrains. With   [] TE   [] TA   [] neuro   [] other: Patient Education: [x] Review HEP    [] Progressed/Changed HEP based on:   [] positioning   [] body mechanics   [] transfers   [] heat/ice application    [] other:      Other Objective/Functional Measures:      Pain Level (0-10 scale) post treatment: 0/10     ASSESSMENT/Changes in Function:  Patient exhibits increased sway with delayed protective response while maintaining NBOS on foam surface with EC. therapist provided min assist to correct stability. Patient challenged with VORx1 on foam surface with NBOS. Added smooth pursuit on foam surface and patient was able to complete without LOB.  Patient reported reduced dizziness as she performed therapeutic exercises and balance activities. Patient will continue to benefit from skilled PT services to modify and progress therapeutic interventions, address functional mobility deficits, address ROM deficits, address strength deficits, analyze and address soft tissue restrictions, analyze and cue movement patterns, assess and modify postural abnormalities and address imbalance/dizziness to attain remaining goals. []  See Plan of Care  []  See progress note/recertification  []  See Discharge Summary         Progress towards goals / Updated goals:  Short Term Goals: To be accomplished in 5 treatments:              1 patient will have established and be I with HEP to aid with self management at discharge              EVAL issued HEP              CURRENT completing 1/3, 1/7/20 1/9              2 patient will tolerate 30-45 minutes skilled Physical Therapy to address dizziness and falls prevention for improved safety with functional mobility at home and in the community              EVAL dizziness at times causes LOB when walking              CURRENT ambulated ~150 ft no dizziness/LOB reported 1/7/20 35 min 1/9   Long Term Goals: To be accomplished in 12 treatments:              1 patient will have FOTO 56 to show increase tolerance to moderate activities               EVAL 38, limited a lot              CURRENT              2 patient will have MMT B 5/5 to aid with increase stability with ADLs and functional mobility               EVAL hip F 4, abd/add 4+, knee F 4 and knee E 4+ all B              CURRENT              3 patient will demonstrate 570 feet ambulation including dynamic/obstacle course activities with no  LOB for carryover to improved safety at home and with community ambulation              EVAL dizziness reported with intermittent LOB.                CURRENT ambulated ~150 ft no dizziness/LOB reported 1/7/20       PLAN  []  Upgrade activities as tolerated     [x]  Continue plan of care  [] Update interventions per flow sheet       []  Discharge due to:_  []  Other:_      Ridge Ruiz, PT 1/14/2020  9:27 AM    Future Appointments   Date Time Provider Nick [place]   1/16/2020 10:00 AM Paulie Hannah [facility]   1/21/2020  9:30 AM Manas Wells, PT [facility]   1/23/2020 10:00 AM Paulie Hannah [facility]   1/28/2020 10:00 AM Manas Wells PT [facility]   1/30/2020 10:00 AM Paulie Hannah [facility]

## 2020-01-16 ENCOUNTER — HOSPITAL ENCOUNTER (OUTPATIENT)
Dept: PHYSICAL THERAPY | Age: 85
Discharge: HOME | End: 2020-01-16
Payer: MEDICARE

## 2020-01-16 PROCEDURE — 97116 GAIT TRAINING THERAPY: CPT

## 2020-01-16 NOTE — PROGRESS NOTES
PT DAILY TREATMENT NOTE 10-18    Patient Name: Raheem Marquez  Date:2020  : 1929  [x]  Patient  Verified  Payor: [place] MEDICARE / Plan: [place] MEDICARE PART A & B / Product Type: Medicare /    In time:957  Out time:1034  Total Treatment Time (min): 37  Visit #: 6 of 12    Medicare/BCBS Only   Total Timed Codes (min):  47 1:1 Treatment Time:  19       Treatment Area: Dizziness and giddiness [R42]    SUBJECTIVE  Pain Level (0-10 scale): dizziness 2  Any medication changes, allergies to medications, adverse drug reactions, diagnosis change, or new procedure performed?: [x] No    [] Yes (see summary sheet for update)  Subjective functional status/changes:   [] No changes reported  Pt reports dizziness is getting much better. HEP is being completed. OBJECTIVE    18 min Therapeutic Exercise:  [x] See flow sheet :   Rationale: increase ROM and increase strength to improve the patients ability to complete functional activities. 10 min Therapeutic Activity:  [x]  See flow sheet :   Rationale: increase ROM, increase strength, improve coordination and increase proprioception  to improve the patients ability to complete household chores. 19 min Gait Training:    Dynamic gait activities and stair training see below   Rationale: to increase ability to step over objects, to go around narrow spaces, and get into home safely to reduce fall risk          With   [x] TE   [x] TA   [] neuro   [x] gait: Patient Education: [x] Review HEP    [] Progressed/Changed HEP based on:   [] positioning   [] body mechanics   [] transfers   [] heat/ice application    [] other:      Other Objective/Functional Measures:   Stairs 6\"  1. Step up downs 12x ea 2 UE  2. Lateral up/downs 12x ea 2 UE  3. 5x4 steps 1 UE step through pattern     Dynamic gait:  1. Squatting putting down/picking up 8 cones over 25 ft x1 ea SBA for safety  2. Lateral stepping over cones 1x ea 25 ft 2 handheld A  3.  Figure 8 working on walking around narrow spaces 3x ea direction SBA for safety  4. Tandem walking 1x 50 ft SBA for safety  5. Half carioca 1x 50 ft ea direction 2 handheld A    Pain Level (0-10 scale) post treatment: dizziness: 0; tired    ASSESSMENT/Changes in Function: Tech helped to supervise some of the exercises. Added dynamica gait exercises to challenge pt and increased functional strength, and endurance with good tolerance to get around home safely. Progressed height of stairs to increase ability to get into home safely with report of increased challenge. Verbal cues and demonstration required to use correct body mechanics. Encouraged pt to complete HEP daily to help to continue to progress PT interventions to improve patient's ability to complete functional and community task independently. At the conclusion of the session, pt reported no dizziness and that she was tired from activity. Patient will continue to benefit from skilled PT services to modify and progress therapeutic interventions, address functional mobility deficits, address ROM deficits, address strength deficits, analyze and address soft tissue restrictions, analyze and cue movement patterns, analyze and modify body mechanics/ergonomics, assess and modify postural abnormalities and address imbalance/dizziness to attain remaining goals.      [x]  See Plan of Care  []  See progress note/recertification  []  See Discharge Summary         Progress towards goals / Updated goals:  Short Term Goals: To be accomplished in 5 treatments:              1 patient will have established and be I with HEP to aid with self management at discharge              EVAL issued HEP              CURRENT completing 1/3, 1/7/20 1/9 1/16              2 patient will tolerate 30-45 minutes skilled Physical Therapy to address dizziness and falls prevention for improved safety with functional mobility at home and in the community              EVAL dizziness at times causes LOB when walking              CURRENT ambulated ~150 ft no dizziness/LOB reported 1/7/20 35 min 1/9  47 min 1/16  Long Term Goals: To be accomplished in 12 treatments:              1 patient will have FOTO 56 to show increase tolerance to moderate activities               EVAL 38, limited a lot              CURRENT              2 patient will have MMT B 5/5 to aid with increase stability with ADLs and functional mobility               EVAL hip F 4, abd/add 4+, knee F 4 and knee E 4+ all B              CURRENT              3 patient will demonstrate 570 feet ambulation including dynamic/obstacle course activities with no  LOB for carryover to improved safety at home and with community ambulation              EVAL dizziness reported with intermittent LOB.                CURRENT ambulated ~150 ft no dizziness/LOB reported 1/7/20    PLAN  [x]  Upgrade activities as tolerated     [x]  Continue plan of care  [x]  Update interventions per flow sheet       []  Discharge due to:_  []  Other:_      Beckey Cough 1/16/2020  10:19 AM    Future Appointments   Date Time Provider Nick [place]   1/21/2020  9:30 AM Rahel Seay PT [facility]   1/23/2020 10:00 AM Lilliam Palm [facility]   1/28/2020 10:00 AM Rahel Seay PT [facility]   1/30/2020 10:00 AM Lilliam Palm [facility]

## 2020-01-21 ENCOUNTER — HOSPITAL ENCOUNTER (OUTPATIENT)
Dept: PHYSICAL THERAPY | Age: 85
Discharge: HOME | End: 2020-01-21
Payer: MEDICARE

## 2020-01-21 PROCEDURE — 97530 THERAPEUTIC ACTIVITIES: CPT

## 2020-01-21 PROCEDURE — 97110 THERAPEUTIC EXERCISES: CPT

## 2020-01-21 NOTE — PROGRESS NOTES
PT DAILY TREATMENT NOTE 10-18    Patient Name: Amado Langston  Date:2020  : 1929  [x]  Patient  Verified  Payor: [place] MEDICARE / Plan: VA MEDICARE PART A & B / Product Type: Medicare /    In time:9:25  Out time:10:04  Total Treatment Time (min): 39  Visit #: 7 of 12    Medicare/BCBS Only   Total Timed Codes (min):  39 1:1 Treatment Time:  39       Treatment Area: Dizziness and giddiness [R42]    SUBJECTIVE  Pain Level (0-10 scale):0  Any medication changes, allergies to medications, adverse drug reactions, diagnosis change, or new procedure performed?: [x] No    [] Yes (see summary sheet for update)  Subjective functional status/changes:   [] No changes reported  \"I'm doing fair today. Can you get me out a little earlier today I have a  to attend? \"    OBJECTIVE      12 min Therapeutic Exercise:  [x] See flow sheet :   Rationale: increase ROM and increase strength to improve the patients ability to tolerate ADLs and activities. 27 min Therapeutic Activity:  [x]  See flow sheet :   Rationale: increase ROM, increase strength and improve coordination  to improve the patients ability to tolerate ADLs and activities. With   [x] TE   [x] TA   [] neuro   [] other: Patient Education: [x] Review HEP    [x] Progressed/Changed HEP based on:   [] positioning   [] body mechanics   [] transfers   [] heat/ice application    [x] other: Patient education on safety      Other Objective/Functional Measures: Patient performed LE strengthening exercise to facilitate strength and endurance with functional activities. Added bike Lv2 X 5 mins. Pain Level (0-10 scale) post treatment: 0 (no dizziness)    ASSESSMENT/Changes in Function: Progressing well toward goals, with reports of improvement as evident by increased activity tolerance prior to onset of dizziness . Patient report most episodes dizziness occurs in the mornings when she awakes usually last about 30 minutes.  Patient also reports occasional onset of dizziness during the day , in which she reports trying to the task at hand prior to sitting. Patient educated of the importance of safety to minimize fall risk. Patient reports fears for bending down to retrieve from her cabinets. Patient will continue to benefit from skilled PT services to modify and progress therapeutic interventions, address ROM deficits, address strength deficits and instruct in home and community integration to attain remaining goals. [x]  See Plan of Care  [x]  See progress note/recertification  []  See Discharge Summary         Progress towards goals / Updated goals:Short Term Goals: To be accomplished in 5 treatments:              1 patient will have established and be I with HEP to aid with self management at discharge              EVAL issued HEP              CURRENT completing 1/3, 1/7/20 1/9 1/16, 1/21/2020              2 patient will tolerate 30-45 minutes skilled Physical Therapy to address dizziness and falls prevention for improved safety with functional mobility at home and in the community              EVAL dizziness at times causes LOB when walking              CURRENT ambulated ~150 ft no dizziness/LOB reported 1/7/20 35 min 1/9  47 min 1/16, 30 mins 1/21/2020  Long Term Goals: To be accomplished in 12 treatments:              1 patient will have FOTO 56 to show increase tolerance to moderate activities               EVAL 38, limited a lot              CURRENT              2 patient will have MMT B 5/5 to aid with increase stability with ADLs and functional mobility               EVAL hip F 4, abd/add 4+, knee F 4 and knee E 4+ all B              CURRENT              3 patient will demonstrate 570 feet ambulation including dynamic/obstacle course activities with no  LOB for carryover to improved safety at home and with community ambulation              EVAL dizziness reported with intermittent LOB.                CURRENT ambulated ~150 ft no dizziness/LOB reported 1/7/20    PLAN  [x]  Upgrade activities as tolerated     [x]  Continue plan of care  []  Update interventions per flow sheet       []  Discharge due to:_  []  Other:_      Carlos Peres 1/21/2020  8:18 AM    Future Appointments   Date Time Provider Nick Houston   1/21/2020  9:30 AM [facility] [address]   1/23/2020 10:00 AM [facility] [address]   1/28/2020 10:00 AM [facility] [address]   1/30/2020 10:00 AM [facility] [address]

## 2020-01-23 ENCOUNTER — HOSPITAL ENCOUNTER (OUTPATIENT)
Dept: PHYSICAL THERAPY | Age: 85
Discharge: HOME | End: 2020-01-23
Payer: MEDICARE

## 2020-01-23 PROCEDURE — 97110 THERAPEUTIC EXERCISES: CPT

## 2020-01-23 PROCEDURE — 97112 NEUROMUSCULAR REEDUCATION: CPT

## 2020-01-23 PROCEDURE — 97530 THERAPEUTIC ACTIVITIES: CPT

## 2020-01-23 NOTE — PROGRESS NOTES
PT DAILY TREATMENT NOTE 10-18    Patient Name: Carmelo Nava  Date:2020  : 1929  [x]  Patient  Verified  Payor: VA MEDICARE / Plan: VA MEDICARE PART A & B / Product Type: Medicare /    In time:954  Out time:1027  Total Treatment Time (min): 33  Visit #: 8 of 12    Medicare/BCBS Only   Total Timed Codes (min):  33 1:1 Treatment Time:  33       Treatment Area: Dizziness and giddiness [R42]    SUBJECTIVE  Pain Level (0-10 scale): 0  Any medication changes, allergies to medications, adverse drug reactions, diagnosis change, or new procedure performed?: [x] No    [] Yes (see summary sheet for update)  Subjective functional status/changes:   [] No changes reported  Pt reports 90% improvement due to skilled PT intervention with improvement in dizziness and she can do more. She would to do two more appointments before discharge. She would like to continue to address balance and strength. OBJECTIVE    10 min Therapeutic Exercise:  [x] See flow sheet :   Rationale: increase ROM and increase strength to improve the patients ability to complete functional activities. 18 min Therapeutic Activity:  [x]  See flow sheet :   Rationale: increase ROM, increase strength, improve coordination, increase proprioception and Reassessment, FOTO  to improve the patients ability to complete household chores. 5 min Neuromuscular Re-education:  [x]  See flow sheet :   Rationale: increase strength, improve coordination, improve balance and increase proprioception to improve the patients sensory and motor balance strategies to decrease risk of falls. With   [x] TE   [x] TA   [x] neuro   [] other: Patient Education: [x] Review HEP    [] Progressed/Changed HEP based on:   [] positioning   [] body mechanics   [] transfers   [] heat/ice application    [] other:      Other Objective/Functional Measures:   Stairs 8\"  1.  Step up/downs 10x ea 1 UE  2 lateral up/downs 10x ea 1 UE  3. 3 steps 4x 1 UE step through pattern    30 sec sit to stand 12x no UE    MMT hip F B 5 ABD B 4+ ADD B 5 knee F R 4+ L 5 ext: R 4+ L 4    Balance:  1. SLS EO floor R 4\" L 6\" no UE  2. SLS EO floor B 2x30\" ea lifting 1 finger on and off // bars to challenge pt  3. Semi-tandem EC foma 2x30\" ea 1 finger    Dynamic gait:  1. Tandem walking 1x 100 ft  2. Lateral walking 1x 60 ft ea LE    Pain Level (0-10 scale) post treatment: 0    ASSESSMENT/Changes in Function: Pt is showing improvement due to skilled PT interventions with good tolerance to exercises and no longer experiencing dizziness. She does not required assistance for stability to perform exercises. Pt still is reporting fatigue at end of sessions. Verbal cues and demonstration required to use correct body mechanics. Encouraged pt to complete HEP daily to help to continue to progress PT interventions to improve patient's ability to complete functional and community task independently. At the conclusion of the session, pt reported no pain. Patient will continue to benefit from skilled PT services to modify and progress therapeutic interventions, address functional mobility deficits, address ROM deficits, address strength deficits, analyze and address soft tissue restrictions, analyze and cue movement patterns, analyze and modify body mechanics/ergonomics, assess and modify postural abnormalities and address imbalance/dizziness to attain remaining goals.      [x]  See Plan of Care  [x]  See progress note/recertification  []  See Discharge Summary         Progress towards goals / Updated goals:  Short Term Goals: To be accomplished in 5 treatments:              1 patient will have established and be I with HEP to aid with self management at discharge              EVAL issued HEP              CURRENT completing 1/3, 1/7/20 1/9 1/16, 1/21/2020 MET 1/23              2 patient will tolerate 30-45 minutes skilled Physical Therapy to address dizziness and falls prevention for improved safety with functional mobility at home and in the community              EVAL dizziness at times causes LOB when walking              CURRENT ambulated ~150 ft no dizziness/LOB reported 1/7/20 35 min 1/9  47 min 1/16, 30 mins 1/21/2020 MET 1/23  Long Term Goals: To be accomplished in 12 treatments:              1 patient will have FOTO 56 to show increase tolerance to moderate activities               EVAL 38, limited a lot              CURRENT 64 1/23 MET              2 patient will have MMT B 5/5 to aid with increase stability with ADLs and functional mobility               EVAL hip F 4, abd/add 4+, knee F 4 and knee E 4+ all B              CURRENT              3 patient will demonstrate 570 feet ambulation including dynamic/obstacle course activities with no  LOB for carryover to improved safety at home and with community ambulation              EVAL dizziness reported with intermittent LOB.                CURRENT ambulated ~150 ft no dizziness/LOB reported 1/7/20    PLAN  [x]  Upgrade activities as tolerated     [x]  Continue plan of care  [x]  Update interventions per flow sheet       []  Discharge due to:_  []  Other:_      Jacobo Velasco 1/23/2020  9:56 AM    Future Appointments   Date Time Provider Nick Houston   1/23/2020 10:00 AM Katty Banks [facility]   1/28/2020 10:00 AM Lor Solomon PT [facility]   1/30/2020 10:00 AM Kailyn Sarmiento, PTA [facility]

## 2020-01-23 NOTE — PROGRESS NOTES
[facility] [address] [address] [facility], [address] [place] [address], [facility] [address]  [phone] fax      Continued Plan of Care/ Re-certification for Physical Therapy Services    Patient name: Michelle Pablo Start of Care: 2019   Referral source: Vitor Ann MD : 1929   Medical/Treatment Diagnosis: Dizziness and giddiness [R42]  Payor: VA MEDICARE / Plan: VA MEDICARE PART A & B / Product Type: Medicare /  Onset Date:2-3 weeks     Prior Hospitalization: see medical history Provider#: [id number]   Medications: Verified on Patient Summary List    Comorbidities: osteoporosis, arthritis, DM, HTN, visual impaired  Prior Level of Function:I all areas of ADLS and activities, no AD use.  Tolerated household and community activities without any difficulty     Visits from Start of Care: 8    Missed Visits: 0    The Plan of Care and following information is based on the patient's current status:  Goal:  patient will have established and be I with HEP to aid with self management at discharge  Status at last note/certification: compliance  Current Status: met    Goal: patient will tolerate 30-45 minutes skilled Physical Therapy to address dizziness and falls prevention for improved safety with functional mobility at home and in the community  Status at last note/certification: 47 min  Current Status: met    Goal: patient will have FOTO 56 to show increase tolerance to moderate activities   Status at last note/certification: 64  Current Status: met    Goal: patient will have MMT B 5/5 to aid with increase stability with ADLs and functional mobility   Status at last note/certification: MMT hip F B 5 ABD B 4+ ADD B 5 knee F R 4+ L 5 ext: R 4+ L 4  Current Status: not met    Goal: patient will demonstrate 570 feet ambulation including dynamic/obstacle course activities with no  LOB for carryover to improved safety at home and with community ambulation  tatus at last note/certification: no assistance needed able to self correct without LOB  Current Status: met    Key functional changes: decreased dizziness      Problems/ barriers to goal attainment: decreased endurance     Problem List: decrease strength, impaired gait/ balance, decrease ADL/ functional abilitiies, decrease activity tolerance, decrease flexibility/ joint mobility and decrease transfer abilities    Treatment Plan: Therapeutic exercise, Therapeutic activities, Neuromuscular re-education, Physical agent/modality, Gait/balance training, Manual therapy, Patient education, Self Care training, Functional mobility training, Home safety training and Stair training     Patient Goal (s) has been updated and includes: \"continue to address balance and strength. \"     Goals for this certification period to be accomplished in 2 additional treatments:  1. patient will have MMT B 5/5 to aid with increase stability with ADLs and functional mobility    PN: MMT hip F B 5 ABD B 4+ ADD B 5 knee F R 4+ L 5 ext: R 4+ L 4  2. Pt will demonstrate 10 sec SLS on floor with EO with no UE without LOB to decrease fall risk. PN: SLS EO floor R 4\" L 6\" no UE    Frequency / Duration: Patient to be seen 2 times per week for 2 additional treatments:    Assessment / Recommendations:Pt has completed 8 PT sessions to address dizziness and gait abnormalities. Pt reports 90% improvement due to skilled PT intervention with improvement in dizziness and she can do more. She would to do two more appointments before discharge. She would like to continue to address balance and strength. Pt is showing improvement due to skilled PT interventions with good tolerance to exercises and no longer experiencing dizziness. She does not required assistance for stability to perform exercises. Pt still is reporting fatigue at end of sessions.  Patient will continue to benefit from skilled PT services to modify and progress therapeutic interventions, address functional mobility deficits, address ROM deficits, address strength deficits, analyze and address soft tissue restrictions, analyze and cue movement patterns, analyze and modify body mechanics/ergonomics, assess and modify postural abnormalities and address imbalance/dizziness to attain remaining goals. Certification Period: 1/24/20 to 2/22/20    Maxcine Poag 1/23/2020 11:55 AM    ________________________________________________________________________  I certify that the above Therapy Services are being furnished while the patient is under my care. I agree with the treatment plan and certify that this therapy is necessary. [] I have read the above and request that my patient continue as recommended.   [] I have read the above report and request that my patient continue therapy with the following changes/special instructions: _____________________________________________  [] I have read the above report and request that my patient be discharged from therapy    Physician's Signature:____________Date:_________TIME:________    ** Signature, Date and Time must be completed for valid certification **    Please sign and return to [facility] [address] [place] [address], [address] [facility] [address],[facility] [address]  [phone] fax

## 2020-01-28 ENCOUNTER — HOSPITAL ENCOUNTER (OUTPATIENT)
Dept: PHYSICAL THERAPY | Age: 85
Discharge: HOME | End: 2020-01-28
Payer: MEDICARE

## 2020-01-28 PROCEDURE — 97112 NEUROMUSCULAR REEDUCATION: CPT

## 2020-01-28 PROCEDURE — 97110 THERAPEUTIC EXERCISES: CPT

## 2020-01-28 PROCEDURE — 97530 THERAPEUTIC ACTIVITIES: CPT

## 2020-01-28 NOTE — PROGRESS NOTES
PT DAILY TREATMENT NOTE 10-18    Patient Name: Wes Winters  Date:2020  : 1929  [x]  Patient  Verified  Payor: VA MEDICARE / Plan: VA MEDICARE PART A & B / Product Type: Medicare /    In time:955  Out time:1040  Total Treatment Time (min): 45  Visit #: 1 of 2    Medicare/BCBS Only   Total Timed Codes (min):  45 1:1 Treatment Time:  45       Treatment Area: Dizziness and giddiness [R42]    SUBJECTIVE  Pain Level (0-10 scale): 0  Any medication changes, allergies to medications, adverse drug reactions, diagnosis change, or new procedure performed?: [x] No    [] Yes (see summary sheet for update)  Subjective functional status/changes:   [] No changes reported  \"I am not having any pain today. \"    OBJECTIVE     15 min Therapeutic Exercise:  [x] See flow sheet :   Rationale: increase ROM, increase strength, improve coordination, improve balance and increase proprioception to improve the patients ability to tolerate ADLS and activities    15 min Therapeutic Activity:  [x]  See flow sheet :   Rationale: increase ROM, increase strength, improve coordination, improve balance and increase proprioception  to improve the patients ability to tolerate ADLs and activities     15 min Neuromuscular Re-education:  [x]  See flow sheet :   Rationale: increase ROM, increase strength, improve coordination, improve balance and increase proprioception  to improve the patients ability to tolerate ADLS and activities             With   [x] TE   [x] TA   [x] neuro   [] other: Patient Education: [x] Review HEP    [] Progressed/Changed HEP based on:   [] positioning   [] body mechanics   [] transfers   [] heat/ice application    [x] other: POC,      Other Objective/Functional Measures: VC exercises and tech   Stairs 8\"  1. Step up/downs 10x ea 1 UE  2 lateral up/downs 10x ea 1 UE  3. 3 steps 4x 1 UE step through pattern     30 sec sit to stand 12x no UE from low mat table      Balance:  1.  SLS EO floor R 19\" L 13\" no UE 1 repetition each  2. SLS EO floor B 2x30\" ea lifting 1 finger on and off // bars to challenge pt  3. Semi-tandem EC foam 2x30\" ea 1 finger     Dynamic gait:  1. Tandem walking 1x 100 ft  2. Lateral walking 1x 60 ft ea LE  Mini squats on foam increased to 20 X      Ham curls with 1 1/2# increased to 20X and marches on foam increased to 20X      Pain Level (0-10 scale) post treatment: 0    ASSESSMENT/Changes in Function: progressing well toward goals with less dizziness and increased strength reported. She denies having any falls. She tolerated increased reps with no difficulty showing increased endurance and activity tolerance. Patient will continue to benefit from skilled PT services to modify and progress therapeutic interventions, address functional mobility deficits, address ROM deficits, address strength deficits, analyze and address soft tissue restrictions, analyze and cue movement patterns, analyze and modify body mechanics/ergonomics, assess and modify postural abnormalities and instruct in home and community integration to attain remaining goals. [x]  See Plan of Care  [x]  See progress note/recertification  []  See Discharge Summary         Progress towards goals / Updated goals:  Goals for this certification period to be accomplished in 2 additional treatments:  1. patient will have MMT B 5/5 to aid with increase stability with ADLs and functional mobility                PN: MMT hip F B 5 ABD B 4+ ADD B 5 knee F R 4+ L 5 ext: R 4+ L 4  CURRENT NA 1/28/20  2. Pt will demonstrate 10 sec SLS on floor with EO with no UE without LOB to decrease fall risk.                PN: SLS EO floor R 4\" L 6\" no UE   CURRENT SLS EO floor right  19 seconds, left 13 seconds 1/28/20       PLAN  [x]  Upgrade activities as tolerated     [x]  Continue plan of care  []  Update interventions per flow sheet       []  Discharge due to:_  [x]  Other:_ recheck for discharge     Vanessa Silverio, PT 1/28/2020  10:41 AM    Future Appointments   Date Time Provider Nick Houston   1/30/2020 10:00 AM Can [facility] [place] [facility]

## 2020-01-30 ENCOUNTER — HOSPITAL ENCOUNTER (OUTPATIENT)
Dept: PHYSICAL THERAPY | Age: 85
Discharge: HOME | End: 2020-01-30
Payer: MEDICARE

## 2020-01-30 PROCEDURE — 97530 THERAPEUTIC ACTIVITIES: CPT

## 2020-01-30 NOTE — PROGRESS NOTES
Physical Therapy Discharge Instructions      [facility] [address], [address] [place] [address], [facility] [address]  [phone] fax    Patient: Rick Osorio  : 1929      Continue Home Exercise Program 2 times per day for  weeks, then decrease to  times per week      Continue with    [] Ice  as needed  times per day     [x] Heat           Follow up with MD:     [x] Upon completion of therapy     [] As needed      Recommendations:     [x]   Return to activity with home program    []   Return to activity with the following modifications:       []Post Rehab Program    []Join Independent aquatic program     []Return to/join local gym        Additional Comments:           Megan Conde, RAVI 2020 10:40 AM

## 2020-01-30 NOTE — PROGRESS NOTES
[facility] [address]  [address] [facility], [address] [place] [address], [address] [place], [place] [address] [phone] fax      Discharge Summary    Patient name: Natalie Ramirez     Start of Care: 19  Referral source: Glenda Mckeon MD    : 1929  Medical/Treatment Diagnosis: Dizziness and giddiness [R42]  Payor: VA MEDICARE / Plan: VA MEDICARE PART A & B / Product Type: Medicare /        Onset Date:2-3 weeks (2019)  Prior Hospitalization: see medical history   Provider#: [id number]  Comorbidities: osteoporosis, arthritis, DM, HTN, visual impaired  Prior Level of Function:I all areas of ADLS and activities, no AD use. Tolerated household and community activities without any difficulty Medications: Verified on Patient Summary List    Visits from Start of Care: 10    Missed Visits: 0  Reporting Period : 2020 to 2020      Summary of Care:patient seen for 10 total sessions. Mrs. Иван Samaniego has shown good improvement since initial eval. MMT and FOTO have improved sincelast assessed. She should follow up with her MD as needed and continue with her HEP and attempt self management . Thank you.    Goal:patient will have MMT B 5/5 to aid with increase stability with ADLs and functional mobility   Status at last note/certification:last MD note  Status at discharge: not met, progressing MMT hip F  (B) 4+   Knee F  Right   4+  Left 5  Knee ext  (B) 4+    Hip  ABD  (B)4+  ADD  (B) 5     20    Goal:Pt will demonstrate 10 sec SLS on floor with EO with no UE without LOB to decrease fall risk  Status at last note/certification:last MD note  Status at discharge: met  SLS EO floor right  10 seconds LOB but was able to recover, left 10 seconds 20       ASSESSMENT/RECOMMENDATIONS:  [x]Discontinue therapy progressing towards or have reached established goals  []Discontinue therapy due to lack of appreciable progress towards goals  []Discontinue therapy due to lack of attendance or compliance  []Other:     Thank you for this referral.     Shawn Anne, PT 1/30/2020 11:23 AM

## 2020-01-30 NOTE — PROGRESS NOTES
PT DAILY TREATMENT NOTE 10-18    Patient Name: Bill Brown  Date:2020  : 1929  [x]  Patient  Verified  [place]or: [place] MED[place] / Plan: [place] MEDICARE PART A & B / Product Type: Medicare /    In time:9:59  Out time:10:39  Total Treatment Time (min): 41  Visit #: 2 of 2    Medicare/BCBS Only   Total Timed Codes (min):  41 1:1 Treatment Time:  32       Treatment Area: Dizziness and giddiness [R42]    SUBJECTIVE  Pain Level (0-10 scale): 0  Any medication changes, allergies to medications, adverse drug reactions, diagnosis change, or new procedure performed?: [x] No    [] Yes (see summary sheet for update)  Subjective functional status/changes:   [] No changes reported  \"Im ok. \"    OBJECTIVE    Modality rationale: decrease edema, decrease inflammation, decrease pain and increase tissue extensibility to improve the patients ability to perform ADL   Min Type Additional Details    [] Estim:  []Unatt       []IFC  []Premod                        []Other:  []w/ice   []w/heat  Position:  Location:    [] Estim: []Att    []TENS instruct  []NMES                    []Other:  []w/US   []w/ice   []w/heat  Position:  Location:    []  Traction: [] Cervical       []Lumbar                       [] Prone          []Supine                       []Intermittent   []Continuous Lbs:  [] before manual  [] after manual    []  Ultrasound: []Continuous   [] Pulsed                           []1MHz   []3MHz W/cm2:  Location:    []  Iontophoresis with dexamethasone         Location: [] Take home patch   [] In clinic    []  Ice     []  heat  []  Ice massage  []  Laser   []  Anodyne Position:  Location:    []  Laser with stim  []  Other:  Position:  Location:    []  Vasopneumatic Device Pressure:       [] lo [] med [] hi   Temperature: [] lo [] med [] hi   [x] Skin assessment post-treatment:  [x]intact []redness- no adverse reaction    []redness  adverse reaction:      min []Eval                  []Re-Eval       16 min Therapeutic Exercise:  [x] See flow sheet :   Rationale: increase ROM and increase strength to improve the patients ability to perform ADL    25 min Therapeutic Activity:  [x]  See flow sheet :   Rationale: increase ROM and increase strength  to improve the patients ability to perform ADL      min Neuromuscular Re-education:  []  See flow sheet :   Rationale:   to improve the patients ability to      min Manual Therapy:     Rationale: decrease pain, increase ROM, increase tissue extensibility and decrease edema  to perform ADL     min Gait Training:  ___ feet with ___ device on level surfaces with ___ level of assist   Rationale: With   [x] TE   [] TA   [] neuro   [] other: Patient Education: [x] Review HEP    [] Progressed/Changed HEP based on:   [] positioning   [] body mechanics   [] transfers   [] heat/ice application    [x] other: REASSESS GOALS/DC PROCEDURE     Other Objective/Functional Measures: FOTO:      MMT hip F  (B) 4+   Knee F  Right   4+  Left 5  Knee ext  (B) 4+    Hip  ABD  (B)4+  ADD  (B) 5    Pain Level (0-10 scale) post treatment: 0    ASSESSMENT/Changes in Function: Mrs. Gualberto Burnham has shown good improvement since initial eval. MMT and FOTO have improved sincelast assessed. Pt was slightly challenged with     Patient will continue to benefit from skilled PT services to address functional mobility deficits, address ROM deficits, address strength deficits, analyze and address soft tissue restrictions and analyze and cue movement patterns to attain remaining goals.      [x]  See Plan of Care  []  See progress note/recertification  []  See Discharge Summary         Progress towards goals / Updated goals:  Goals for this certification period to be accomplished in 2 additional treatments:  1. patient will have MMT B 5/5 to aid with increase stability with ADLs and functional mobility                PN: MMT hip F B 5 ABD B 4+ ADD B 5 knee F R 4+ L 5 ext: R 4+ L 4  CURRENT  MMT hip F  (B) 4+   Knee F Right   4+  Left 5  Knee ext  (B) 4+    Hip  ABD  (B)4+  ADD  (B) 5     1/30/20  2. Pt will demonstrate 10 sec SLS on floor with EO with no UE without LOB to decrease fall risk.               PN: SLS EO floor R 4\" L 6\" no UE              CURRENT SLS EO floor right  10 seconds LOB but was able to recover, left 10 seconds 1/30/20    PLAN  []  Upgrade activities as tolerated     []  Continue plan of care  []  Update interventions per flow sheet       []  Discharge due to:_  [x]  Other:_  [facility], [facility] 1/30/2020  10:04 AM    No future appointments.

## 2021-02-09 ENCOUNTER — HOSPITAL ENCOUNTER (EMERGENCY)
Age: 86
Discharge: HOME | End: 2021-02-09
Attending: STUDENT IN AN ORGANIZED HEALTH CARE EDUCATION/TRAINING PROGRAM
Payer: MEDICARE

## 2021-02-09 VITALS
OXYGEN SATURATION: 94 % | HEART RATE: 80 BPM | TEMPERATURE: 98.2 F | WEIGHT: 169 LBS | RESPIRATION RATE: 18 BRPM | SYSTOLIC BLOOD PRESSURE: 134 MMHG | HEIGHT: 63 IN | DIASTOLIC BLOOD PRESSURE: 52 MMHG | BODY MASS INDEX: 29.95 KG/M2

## 2021-02-09 DIAGNOSIS — N39.0 URINARY TRACT INFECTION WITHOUT HEMATURIA, SITE UNSPECIFIED: Primary | ICD-10-CM

## 2021-02-09 LAB
ANION GAP SERPL CALC-SCNC: 10 MMOL/L (ref 3–18)
APPEARANCE UR: CLEAR
BACTERIA URNS QL MICRO: ABNORMAL /HPF
BASOPHILS # BLD: 0 K/UL (ref 0–0.1)
BASOPHILS NFR BLD: 0 % (ref 0–2)
BILIRUB UR QL: NEGATIVE
BUN SERPL-MCNC: 35 MG/DL (ref 7–18)
BUN/CREAT SERPL: 18 (ref 12–20)
CALCIUM SERPL-MCNC: 8.8 MG/DL (ref 8.5–10.1)
CHLORIDE SERPL-SCNC: 98 MMOL/L (ref 100–111)
CO2 SERPL-SCNC: 22 MMOL/L (ref 21–32)
COLOR UR: YELLOW
CREAT SERPL-MCNC: 1.95 MG/DL (ref 0.6–1.3)
DIFFERENTIAL METHOD BLD: ABNORMAL
EOSINOPHIL # BLD: 0 K/UL (ref 0–0.4)
EOSINOPHIL NFR BLD: 1 % (ref 0–5)
EPITH CASTS URNS QL MICRO: ABNORMAL /LPF (ref 0–5)
ERYTHROCYTE [DISTWIDTH] IN BLOOD BY AUTOMATED COUNT: 13.8 % (ref 11.6–14.5)
GLUCOSE SERPL-MCNC: 98 MG/DL (ref 74–99)
GLUCOSE UR STRIP.AUTO-MCNC: NEGATIVE MG/DL
HCT VFR BLD AUTO: 40.6 % (ref 35–45)
HGB BLD-MCNC: 13.8 G/DL (ref 12–16)
HGB UR QL STRIP: NEGATIVE
KETONES UR QL STRIP.AUTO: NEGATIVE MG/DL
LEUKOCYTE ESTERASE UR QL STRIP.AUTO: ABNORMAL
LYMPHOCYTES # BLD: 1.1 K/UL (ref 0.9–3.6)
LYMPHOCYTES NFR BLD: 26 % (ref 21–52)
MCH RBC QN AUTO: 29 PG (ref 24–34)
MCHC RBC AUTO-ENTMCNC: 34 G/DL (ref 31–37)
MCV RBC AUTO: 85.3 FL (ref 74–97)
MONOCYTES # BLD: 0.6 K/UL (ref 0.05–1.2)
MONOCYTES NFR BLD: 14 % (ref 3–10)
NEUTS SEG # BLD: 2.6 K/UL (ref 1.8–8)
NEUTS SEG NFR BLD: 59 % (ref 40–73)
NITRITE UR QL STRIP.AUTO: POSITIVE
PH UR STRIP: 5.5 [PH] (ref 5–8)
PLATELET # BLD AUTO: 163 K/UL (ref 135–420)
PMV BLD AUTO: 12.3 FL (ref 9.2–11.8)
POTASSIUM SERPL-SCNC: 4.3 MMOL/L (ref 3.5–5.5)
PROT UR STRIP-MCNC: NEGATIVE MG/DL
RBC # BLD AUTO: 4.76 M/UL (ref 4.2–5.3)
RBC #/AREA URNS HPF: ABNORMAL /HPF (ref 0–5)
SODIUM SERPL-SCNC: 130 MMOL/L (ref 136–145)
SP GR UR REFRACTOMETRY: 1 (ref 1–1.03)
UROBILINOGEN UR QL STRIP.AUTO: 0.2 EU/DL (ref 0.2–1)
WBC # BLD AUTO: 4.5 K/UL (ref 4.6–13.2)
WBC URNS QL MICRO: ABNORMAL /HPF (ref 0–4)

## 2021-02-09 PROCEDURE — 99285 EMERGENCY DEPT VISIT HI MDM: CPT

## 2021-02-09 PROCEDURE — 99284 EMERGENCY DEPT VISIT MOD MDM: CPT

## 2021-02-09 PROCEDURE — 85025 COMPLETE CBC W/AUTO DIFF WBC: CPT

## 2021-02-09 PROCEDURE — 80048 BASIC METABOLIC PNL TOTAL CA: CPT

## 2021-02-09 PROCEDURE — 74011250636 HC RX REV CODE- 250/636: Performed by: STUDENT IN AN ORGANIZED HEALTH CARE EDUCATION/TRAINING PROGRAM

## 2021-02-09 PROCEDURE — 81001 URINALYSIS AUTO W/SCOPE: CPT

## 2021-02-09 RX ORDER — CIPROFLOXACIN 500 MG/1
250 TABLET ORAL 2 TIMES DAILY
Qty: 3 TAB | Refills: 0 | Status: SHIPPED | OUTPATIENT
Start: 2021-02-09 | End: 2021-02-12

## 2021-02-09 RX ADMIN — SODIUM CHLORIDE 1000 ML: 900 INJECTION, SOLUTION INTRAVENOUS at 11:23

## 2021-02-09 NOTE — H&P
EMERGENCY DEPARTMENT HISTORY AND PHYSICAL EXAM    10:12 AM      Date: 2/9/2021  Patient Name: Carrie Jade    History of Presenting Illness     Chief Complaint   Patient presents with    Abdominal Pain    Positive For Covid-19     CC: L sided abd and flank pain    VS in room: HR 69, O2 94%, /52    History Provided By: Patient  Location/Duration/Severity/Modifying factors     HPI    L sided abd/flank pain. Has been present for last 3 days. Pain is achy, non-radiating, intermittent, 8/10 at worst and 6/10 at base. [facility], was given covid test which was positive and discharged w/zofran. [facility]ray [facility] taken at [facility], no results available presently. Denies hematuria, dysuria. Denies N/V. Endorses diarrhea on Saturday, non-bloody. Resolved Sunday and has been ok since then. Did not take medications for diarrhea. Denies fever, chills. Last BM last night. Endorses flatus      PCP: Jason Sun MD    Current Outpatient Medications   Medication Sig Dispense Refill    losartan (COZAAR) 100 mg tablet       gabapentin (NEURONTIN) 300 mg capsule Take 2 Caps by mouth three (3) times daily. 180 Cap 2    glimepiride (AMARYL) 2 mg tablet Take  by mouth every morning.  amLODIPine (NORVASC) 5 mg tablet Take 5 mg by mouth daily.  lisinopril (PRINIVIL, ZESTRIL) 20 mg tablet Take  by mouth daily.  simvastatin (ZOCOR) 20 mg tablet Take  by mouth nightly.  allopurinol (ZYLOPRIM) 100 mg tablet Take  by mouth daily.  calcium-cholecalciferol, D3, (CALTRATE 600+D) tablet Take 1 Tab by mouth two (2) times a day.  cholecalciferol, vitamin D3, (VITAMIN D3) 2,000 unit tab Take  by mouth.  colchicine 0.6 mg tablet Take 0.6 mg by mouth daily as needed.  omeprazole (PRILOSEC) 20 mg capsule Take 20 mg by mouth daily as needed.  HYDROCHLOROTHIAZIDE PO Take  by mouth daily.          Past History     Past Medical History:  Past Medical History:   Diagnosis Date  Arthritis     Chronic kidney disease     only has 1/2 left kidney    Diabetes (HCC)     GERD (gastroesophageal reflux disease)     Gout     High cholesterol     Hypertension        Past Surgical History:  Past Surgical History:   Procedure Laterality Date    HX UROLOGICAL      Kidney left        Family History:  History reviewed. No pertinent family history. Social History:  Social History     Tobacco Use    Smoking status: Never Smoker    Smokeless tobacco: Never Used   Substance Use Topics    Alcohol use: No    Drug use: No       Allergies: Allergies   Allergen Reactions    Aspirin Shortness of Breath         Review of Systems       Review of Systems   Constitutional: Positive for appetite change. Negative for chills and fever. Decreased appetite   HENT: Negative for congestion, rhinorrhea, sinus pressure and sore throat. Respiratory: Positive for cough. Negative for shortness of breath. Mild productive cough: mucous   Cardiovascular: Negative for chest pain. Gastrointestinal: Positive for abdominal pain and diarrhea. Negative for blood in stool, constipation, nausea, rectal pain and vomiting. Abd pain: L side and flank  Diarrhea : non-bloody; resolved   Genitourinary: Positive for urgency. Negative for dysuria and hematuria. Urgency and increased freq   Musculoskeletal: Negative for neck pain and neck stiffness. Skin: Negative for rash. Allergic/Immunologic:        Aspirin: fatigue   Neurological: Negative for dizziness, syncope and headaches. Psychiatric/Behavioral: Negative. Physical Exam     Visit Vitals  BP (!) 134/52   Pulse 80   Temp 98.2 °F (36.8 °C)   Resp 18   Ht 5' 3\" (1.6 m)   Wt 76.7 kg (169 lb)   SpO2 94%   BMI 29.94 kg/m²       Physical Exam  Constitutional:       General: She is not in acute distress. HENT:      Head: Normocephalic and atraumatic. Mouth/Throat:      Pharynx: No pharyngeal swelling or oropharyngeal exudate. Eyes:      General: No scleral icterus. Extraocular Movements: Extraocular movements intact. Cardiovascular:      Rate and Rhythm: Normal rate and regular rhythm. Heart sounds: No murmur. No gallop. Pulmonary:      Effort: Pulmonary effort is normal.      Breath sounds: Normal breath sounds. Abdominal:      General: A surgical scar is present. Bowel sounds are normal. There is no distension. There are no signs of injury. Palpations: Abdomen is soft. There is no mass. Tenderness: There is abdominal tenderness in the left upper quadrant and left lower quadrant. There is no left CVA tenderness or rebound. Skin:     General: Skin is dry. Comments: Tenting b/l hands   Neurological:      General: No focal deficit present. Mental Status: She is alert. Psychiatric:         Mood and Affect: Mood normal.         Behavior: Behavior normal.           Diagnostic Study Results     Labs -  No results found for this or any previous visit (from the past 12 hour(s)). Radiologic Studies -   No orders to display         Medical Decision Making   I am the first provider for this patient. I reviewed the vital signs, available nursing notes, past medical history, past surgical history, family history and social history. Vital Signs-Reviewed the patient's vital signs. Records Reviewed: Nursing Notes and Old Medical Records (Time of Review: 10:12 AM)    ED Course: Progress Notes, Reevaluation, and Consults:  CBC, BMP, UA and BG  UA suggestive of UTI. Will discharge w/Cipro 250mg BID x3 days and portable pulse ox for monitoring 2/2 covid (+) status       Provider Notes (Medical Decision Making):   MDM  Diverticulosis v obstx v UTI v segmental colonitis  History not suggestive of any bowel obstruction. CBC not suggestive of segmental colonitis/diverticulitis. UA suggestive of UTI: will discharge w/cipro 250mg BID x3 days     Procedures        Diagnosis     Clinical Impression:   1. Urinary tract infection without hematuria, site unspecified        Disposition: DC home w/cipro    Follow-up Information    None          Discharge Medication List as of 2/9/2021  1:52 PM      START taking these medications    Details   ciprofloxacin HCl (Cipro) 500 mg tablet Take 0.5 Tabs by mouth two (2) times a day for 3 days. , Print, Disp-3 Tab, R-0         CONTINUE these medications which have NOT CHANGED    Details   losartan (COZAAR) 100 mg tablet Historical Med      gabapentin (NEURONTIN) 300 mg capsule Take 2 Caps by mouth three (3) times daily. , Normal, Disp-180 Cap, R-2      glimepiride (AMARYL) 2 mg tablet Take  by mouth every morning., Historical Med      amLODIPine (NORVASC) 5 mg tablet Take 5 mg by mouth daily. , Historical Med      lisinopril (PRINIVIL, ZESTRIL) 20 mg tablet Take  by mouth daily. , Historical Med      simvastatin (ZOCOR) 20 mg tablet Take  by mouth nightly., Historical Med      allopurinol (ZYLOPRIM) 100 mg tablet Take  by mouth daily. , Historical Med      calcium-cholecalciferol, D3, (CALTRATE 600+D) tablet Take 1 Tab by mouth two (2) times a day., Historical Med      cholecalciferol, vitamin D3, (VITAMIN D3) 2,000 unit tab Take  by mouth., Historical Med      colchicine 0.6 mg tablet Take 0.6 mg by mouth daily as needed., Historical Med      omeprazole (PRILOSEC) 20 mg capsule Take 20 mg by mouth daily as needed., Historical Med      HYDROCHLOROTHIAZIDE PO Take  by mouth daily. , Historical Med           Disclaimer: Sections of this note are dictated using utilizing voice recognition software. Minor typographical errors may be present. If questions arise, please do not hesitate to contact me or call our department. I personally saw and examined the patient. I have reviewed and agree with the resident's findings, including all diagnostic interpretations, and plans as written. I was present during the key portions of separately billed procedures.     [address], DO

## 2021-02-10 ENCOUNTER — PATIENT OUTREACH (OUTPATIENT)
Dept: CASE MANAGEMENT | Age: 86
End: 2021-02-10

## 2021-02-10 NOTE — PROGRESS NOTES
Patient contacted regarding [id number] diagnosis\". Discussed COVID-19 related testing which was available at this time. Test results were positive. Patient informed of results, if available? yes     Care Transition Nurse/ Ambulatory Care Manager contacted the patient by telephone to perform post discharge assessment. Call within 2 business days of discharge: Yes Verified name and  with patient as identifiers. Provided introduction to self, and explanation of the CTN/ACM role, and reason for call due to risk factors for infection and/or exposure to COVID-19. Symptoms reviewed with patient who verbalized the following symptoms: fatigue, pain or aching joints, cough, no new symptoms, no worsening symptoms and Pulse ox - 92 standing and moving around      Due to no new or worsening symptoms encounter was not routed to provider for escalation. Discussed follow-up appointments. If no appointment was previously scheduled, appointment scheduling offered:  [facility] follow up appointment(s): No future appointments. Non-[facility] follow up appointment(s): patient will follow up with her PCP     Advance Care Planning:   Does patient have an Advance Directive:  not on file. Patient has following risk factors of: age [de-identified] 80 . CTN/ACM reviewed discharge instructions, medical action plan and red flags such as increased shortness of breath, increasing fever and signs of decompensation with patient who verbalized understanding. Discussed exposure protocols and quarantine with CDC Guidelines What to do if you are sick with coronavirus disease .  Patient was given an opportunity for questions and concerns. The patient agrees to contact the [facility] exposure line [phone], [facility] department [facility]  ([phone] and PCP office for questions related to their healthcare. CTN/ACM provided contact information for future needs.     Reviewed and educated patient on any new and changed medications related to discharge diagnosis   Patient gave permission to CTN to call daughter Steve Brasher to discuss her medical care and to set up Get Well loop. Call placed to daughter and made arrangements for her to call patient daily and then track symptoms on get well loop. Patient/family/caregiver given information for [employer] and agrees to enroll yes  Patient's preferred e-mail: [email or web address]   Patient's preferred phone number: [phone]  Based on Loop alert triggers, patient will be contacted by nurse care manager for worsening symptoms. Pt will be further monitored by COVID Loop Team based on severity of symptoms and risk factors.

## 2021-02-11 ENCOUNTER — PATIENT OUTREACH (OUTPATIENT)
Dept: CASE MANAGEMENT | Age: 86
End: 2021-02-11

## 2021-02-11 NOTE — PROGRESS NOTES
Yellow alert noted in remote COVID-19 Loop Symptom Monitoring Program. Messaged patient to notify Nora Del Toro if symptoms have worsened since yesterday.

## 2021-02-13 ENCOUNTER — PATIENT OUTREACH (OUTPATIENT)
Dept: CASE MANAGEMENT | Age: 86
End: 2021-02-13

## 2021-02-13 NOTE — PROGRESS NOTES
Date/Time:  2021 1:00 PM    Patient contacted regarding remote symptom monitoring program alert or comment received. Verified patients name and  as identifiers. Discussed COVID-19 related testing which was available at this time. Test results were positive. Patient informed of results, if available? yes     RN contacted the family by telephone regarding red alert in Loop remote symptom monitoring program. Red alert due to oxygen levels below 92%. CTN spoke to daughter Jasmyn Lee that reports oxygen levels 94-97%. She states that levels have not been 91% today as entered in [employer]. Education provided regarding infection prevention, and signs and symptoms of COVID-19 and when to seek medical attention with family who verbalized understanding. Discussed exposure protocols and quarantine from [address] you at higher risk for severe illness  and given an opportunity for questions and concerns. The family agrees to contact agrees to contact their provider, COVID-19 hotline [phone], or [place] [address]  ([phone] for questions related to their healthcare. Author provided contact information for future needs. From CDC: Are you at higher risk for severe illness?  Wash your hands often.  Avoid close contact (6 feet, which is about two arm lengths) with people who are sick.  Put distance between yourself and other people if COVID-19 is spreading in your community.  Clean and disinfect frequently touched surfaces.  Avoid all cruise travel and non-essential air travel.  Call your healthcare professional if you have concerns about COVID-19 and your underlying condition or if you are sick. For more information on steps you can take to protect yourself, see CDC's How to Protect Yourself      Patient will continue to self report symptoms using Loop self monitoring. Patient has RN's contact information.

## 2021-02-14 ENCOUNTER — PATIENT OUTREACH (OUTPATIENT)
Dept: CASE MANAGEMENT | Age: 86
End: 2021-02-14

## 2021-02-14 NOTE — PROGRESS NOTES
Loop Alert Outreach      Date/Time:  2021 1:54 PM    Patient contacted regarding Loop Alert received. Verified patients name and  as identifiers. Care Transition Nurse contacted the patient by telephone regarding yellow alert Loop alert. Patient reported the following symptoms: left lowere abd/side pain, fatigue - Reviewed s/s of UTI and dehydration. Red flags to include mental status change reviewed. Pt's blood sugar was 106 this morning. Reviewed s/s of infection being similar to hypoglycemia. Encouraged PCP f/u call on Monday. Due to increased LLQ pain and oxygen sats 92%, patient was advised to self monitor symptoms at home. Advised     Education provided regarding infection prevention, and signs and symptoms of COVID-19 and when to seek medical attention with patient and daughter who verbalized understanding. Discussed exposure protocols and quarantine from [address] you at higher risk for severe illness  and given an opportunity for questions and concerns. The family agrees to contact the COVID-19 hotline [phone] or PCP office for questions related to their healthcare. CTN/ACM provided contact information for future reference. From CDC: Are you at higher risk for severe illness?  Wash your hands often.  Avoid close contact (6 feet, which is about two arm lengths) with people who are sick.  Put distance between yourself and other people if COVID-19 is spreading in your community.  Clean and disinfect frequently touched surfaces.  Avoid all cruise travel and non-essential air travel.  Call your healthcare professional if you have concerns about COVID-19 and your underlying condition or if you are sick. For more information on steps you can take to protect yourself, see CDC's How to Protect Yourself      Patient will continue to self report symptoms using Loop self monitoring. Patient has Care Transition Nurse contact information.

## 2021-02-15 ENCOUNTER — PATIENT OUTREACH (OUTPATIENT)
Dept: CASE MANAGEMENT | Age: 86
End: 2021-02-15

## 2021-02-15 NOTE — PROGRESS NOTES
Date/Time:  2/15/2021 11:40 AM    COVDI-19 test done at [facility]    Patient Tested Positive for COVID-19 as of  21. No dyspnea reported at time of call. States she felling ok at present with no distress. Patient contacted regarding Loop Alert received. Verified patients name and  as identifiers. RN contacted the patient by telephone regarding red Loop alert. Patient reported the following symptoms: shortness of breath. Due to new onset of symptoms, patient was advised to self monitor symptoms at home. Due to  new onset of symptoms encounter was not routed to provider for escalation. Education provided regarding infection prevention, and signs and symptoms of COVID-19 and when to seek medical attention with patient who verbalized understanding. Discussed exposure protocols and quarantine from [address] you at higher risk for severe illness  and given an opportunity for questions and concerns. The patient agrees to contact agrees to contact their provider, COVID-19 hotline [phone], or [facility] [place] [address]  ([phone] for questions related to their healthcare. Author provided contact information for future needs. From CDC: Are you at higher risk for severe illness?  Wash your hands often.  Avoid close contact (6 feet, which is about two arm lengths) with people who are sick.  Put distance between yourself and other people if COVID-19 is spreading in your community.  Clean and disinfect frequently touched surfaces.  Avoid all cruise travel and non-essential air travel.  Call your healthcare professional if you have concerns about COVID-19 and your underlying condition or if you are sick. For more information on steps you can take to protect yourself, see CDC's How to Protect Yourself      Patient will continue to self report symptoms using Loop self monitoring. Patient has RN's contact information.

## 2021-02-16 ENCOUNTER — PATIENT OUTREACH (OUTPATIENT)
Dept: CASE MANAGEMENT | Age: 86
End: 2021-02-16

## 2021-02-16 NOTE — PROGRESS NOTES
Patient states her daughter is checking her oxygen levels. Did record one at 92% but returned to 95% quickly. States she feel better than yesterday with no respiratory distress noted. COVID-19 Screening Follow-up Note    Patient contacted regarding COVID-19 risk and screening. Care Transition Nurse/ Ambulatory Care Manager contacted the patient by telephone to perform follow-up assessment. Verified name and  with patient as identifiers. Patient has following risk factors of: diabetes and chronic kidney disease. Symptoms reviewed with patient who verbalized the following symptoms: no new symptoms and no worsening symptoms. Due to no new or worsening symptoms encounter was not routed to provider for escalation. Education provided regarding infection prevention, and signs and symptoms of COVID-19 and when to seek medical attention with patient who verbalized understanding. Discussed exposure protocols and quarantine from [address] you at higher risk for severe illness  and given an opportunity for questions and concerns. The patient agrees to contact the COVID-19 hotline [phone] or PCP office for questions related to their healthcare. CTN/ACM provided contact information for future reference. From CDC: Are you at higher risk for severe illness?  Wash your hands often.  Avoid close contact (6 feet, which is about two arm lengths) with people who are sick.  Put distance between yourself and other people if COVID-19 is spreading in your community.  Clean and disinfect frequently touched surfaces.  Avoid all cruise travel and non-essential air travel.  Call your healthcare professional if you have concerns about COVID-19 and your underlying condition or if you are sick.     For more information on steps you can take to protect yourself, see CDC's How to [place] for follow-up call in 5-7 days based on severity of symptoms and risk factors.

## 2021-02-17 ENCOUNTER — PATIENT OUTREACH (OUTPATIENT)
Dept: CASE MANAGEMENT | Age: 86
End: 2021-02-17

## 2021-02-17 NOTE — PROGRESS NOTES
Date/Time:  2021 1:09 PM    Patient contacted due to Red alert on LOOP - O2 sat reported at 91%. Reviewed process of doing O2 sat with pulse OX - keeping hand warm, avoiding nail polish and properly placing monitor on finger. Patient states she is not short of breath and feeling well at present. Patient contacted regarding Loop Alert received. Verified patients name and  as identifiers. RN contacted the patient by telephone regarding red Loop alert. Patient reported the following symptoms: no new symptoms and no worsening symptoms. Due to continued symptoms, patient was advised to self monitor symptoms at home. Due to no new or worsening symptoms encounter was not routed to provider for escalation. Education provided regarding infection prevention, and signs and symptoms of COVID-19 and when to seek medical attention with patient who verbalized understanding. Discussed exposure protocols and quarantine from [address] you at higher risk for severe illness  and given an opportunity for questions and concerns. The patient agrees to contact agrees to contact their provider, [facility] [phone], or [facility] [address]  ([phone] for questions related to their healthcare. Author provided contact information for future needs. From CDC: Are you at higher risk for severe illness?  Wash your hands often.  Avoid close contact (6 feet, which is about two arm lengths) with people who are sick.  Put distance between yourself and other people if COVID-19 is spreading in your community.  Clean and disinfect frequently touched surfaces.  Avoid all cruise travel and non-essential air travel.  Call your healthcare professional if you have concerns about COVID-19 and your underlying condition or if you are sick.     For more information on steps you can take to protect yourself, see CDC's How to Protect Yourself Patient will continue to self report symptoms using Loop self monitoring. Patient has RN's contact information.

## 2021-02-18 ENCOUNTER — PATIENT OUTREACH (OUTPATIENT)
Dept: CASE MANAGEMENT | Age: 86
End: 2021-02-18

## 2021-02-18 ENCOUNTER — NURSE TRIAGE (OUTPATIENT)
Dept: OTHER | Facility: CLINIC | Age: 86
End: 2021-02-18

## 2021-02-18 NOTE — TELEPHONE ENCOUNTER
Reason for Disposition   [1] Follow-up call to recent contact AND [2] information only call, no triage required    Protocols used: INFORMATION ONLY CALL - NO TRIAGE-ADULT-AH    Daughter of pt calling regarding survey pt took at last visit- advised caller to call PCP regarding survey and request for lab results.

## 2021-02-18 NOTE — PROGRESS NOTES
Red alert noted in remote COVID-19 Loop Symptom Monitoring Program. Messaged patient to notify Bill Martin if symptoms have worsened since yesterday.

## 2022-03-18 PROBLEM — M54.16 LUMBAR RADICULOPATHY: Status: ACTIVE | Noted: 2017-08-30

## 2022-03-18 PROBLEM — M79.2 NEURITIS: Status: ACTIVE | Noted: 2017-08-30

## 2023-05-12 RX ORDER — GLIMEPIRIDE 2 MG/1
TABLET ORAL
COMMUNITY

## 2023-05-12 RX ORDER — GABAPENTIN 300 MG/1
CAPSULE ORAL 3 TIMES DAILY
COMMUNITY
Start: 2017-09-18

## 2023-05-12 RX ORDER — COLCHICINE 0.6 MG/1
TABLET ORAL DAILY PRN
COMMUNITY

## 2023-05-12 RX ORDER — ALLOPURINOL 100 MG/1
TABLET ORAL DAILY
COMMUNITY

## 2023-05-12 RX ORDER — CALCIUM CARBONATE/VITAMIN D3 600 MG-10
1 TABLET ORAL 2 TIMES DAILY
COMMUNITY

## 2023-05-12 RX ORDER — SIMVASTATIN 20 MG
TABLET ORAL
COMMUNITY

## 2023-05-12 RX ORDER — LOSARTAN POTASSIUM 100 MG/1
TABLET ORAL
COMMUNITY
Start: 2017-10-14

## 2023-05-12 RX ORDER — AMLODIPINE BESYLATE 5 MG/1
5 TABLET ORAL DAILY
COMMUNITY

## 2023-05-12 RX ORDER — OMEPRAZOLE 20 MG/1
CAPSULE, DELAYED RELEASE ORAL DAILY PRN
COMMUNITY

## 2023-05-12 RX ORDER — LISINOPRIL 20 MG/1
TABLET ORAL DAILY
COMMUNITY

## 2023-07-31 ENCOUNTER — OFFICE VISIT (OUTPATIENT)
Age: 88
End: 2023-07-31
Payer: COMMERCIAL

## 2023-07-31 VITALS
HEIGHT: 63 IN | WEIGHT: 158 LBS | BODY MASS INDEX: 28 KG/M2 | OXYGEN SATURATION: 100 % | SYSTOLIC BLOOD PRESSURE: 145 MMHG | HEART RATE: 80 BPM | DIASTOLIC BLOOD PRESSURE: 71 MMHG

## 2023-07-31 DIAGNOSIS — R22.2 MASS OF SKIN OF BACK: Primary | ICD-10-CM

## 2023-07-31 PROCEDURE — 1123F ACP DISCUSS/DSCN MKR DOCD: CPT | Performed by: SURGERY

## 2023-07-31 PROCEDURE — 99204 OFFICE O/P NEW MOD 45 MIN: CPT | Performed by: SURGERY

## 2023-07-31 RX ORDER — SULFAMETHOXAZOLE AND TRIMETHOPRIM 800; 160 MG/1; MG/1
1 TABLET ORAL 2 TIMES DAILY
Qty: 14 TABLET | Refills: 0 | Status: SHIPPED | OUTPATIENT
Start: 2023-07-31 | End: 2023-08-07

## 2023-07-31 NOTE — PROGRESS NOTES
General Surgery Consult    Yoav Vega  Admit date: (Not on file)    MRN: [id number]     : 1929     Age: 80 y.o. Attending Physician: Sofia Mann MD, [facility]      History of Present Illness:      Yoav Vega is a 80 y.o. female who was referred to me for evaluation of an mass on the back. The patient looks amazing for her age and she stated that she had this mass for about 5 years or so. Unfortunately now it is ulcerating and chronically inflamed. She took a course of antibiotics before but it did not help. Patient Active Problem List    Diagnosis Date Noted    Lumbar radiculopathy 2017    Neuritis 2017     Past Medical History:   Diagnosis Date    Arthritis     Chronic kidney disease     only has 1/2 left kidney    Diabetes (HCC)     GERD (gastroesophageal reflux disease)     Gout     High cholesterol     Hypertension       Past Surgical History:   Procedure Laterality Date    UROLOGICAL SURGERY      Kidney left       Social History     Tobacco Use    Smoking status: Never    Smokeless tobacco: Never   Substance Use Topics    Alcohol use: No      Social History     Tobacco Use   Smoking Status Never   Smokeless Tobacco Never     No family history on file. Current Outpatient Medications   Medication Sig    HYDROCHLOROTHIAZIDE PO Take by mouth daily    allopurinol (ZYLOPRIM) 100 MG tablet Take by mouth daily    amLODIPine (NORVASC) 5 MG tablet Take 1 tablet by mouth daily    calcium carb-cholecalciferol 600-10 MG-MCG TABS per tab Take 1 tablet by mouth 2 times daily    Cholecalciferol 50 MCG ( UT) TABS Take by mouth    colchicine (COLCRYS) 0.6 MG tablet Take by mouth daily as needed    gabapentin (NEURONTIN) 300 MG capsule Take by mouth 3 times daily.     glimepiride (AMARYL) 2 MG tablet Take by mouth    lisinopril (PRINIVIL;ZESTRIL) 20 MG tablet Take by mouth daily    losartan (COZAAR) 100 MG tablet ceived the following from [facility]: Outside

## 2023-08-07 ENCOUNTER — HOSPITAL ENCOUNTER (OUTPATIENT)
Facility: HOSPITAL | Age: 88
Setting detail: SPECIMEN
Discharge: HOME | End: 2023-08-10
Payer: COMMERCIAL

## 2023-08-07 ENCOUNTER — OFFICE VISIT (OUTPATIENT)
Age: 88
End: 2023-08-07
Payer: COMMERCIAL

## 2023-08-07 VITALS
WEIGHT: 157 LBS | HEIGHT: 63 IN | BODY MASS INDEX: 27.82 KG/M2 | DIASTOLIC BLOOD PRESSURE: 69 MMHG | SYSTOLIC BLOOD PRESSURE: 142 MMHG | HEART RATE: 81 BPM | OXYGEN SATURATION: 100 % | TEMPERATURE: 97.2 F

## 2023-08-07 DIAGNOSIS — R22.2 MASS OF SKIN OF BACK: Primary | ICD-10-CM

## 2023-08-07 PROCEDURE — 1123F ACP DISCUSS/DSCN MKR DOCD: CPT | Performed by: SURGERY

## 2023-08-07 PROCEDURE — 99214 OFFICE O/P EST MOD 30 MIN: CPT | Performed by: SURGERY

## 2023-08-07 PROCEDURE — 88305 TISSUE EXAM BY PATHOLOGIST: CPT

## 2023-08-07 NOTE — PROGRESS NOTES
Marce Wiley is a 80 y.o. female (: 1929) presenting to address:    Chief Complaint   Patient presents with    Procedure     I and D/Biopsy od soft tissue mass on mid back       Medication list and allergies have been reviewed with Marce Wiley and updated as of today's date. I have gone over all Medical, Surgical and Social History with Jamilinda Gerrisofie and updated/added the information accordingly. 1. Have you been to the ER, Urgent Care or Hospitalized since your last visit? No          2. Have you followed up with your PCP or any other Physicians since your procedure/ last office visit?    No

## 2023-08-07 NOTE — PROGRESS NOTES
General Surgery Consult    Elizabeth Chowdhury  Admit date: (Not on file)    MRN: [id number]     : 1929     Age: 80 y.o. Attending Physician: Layo Reyes MD, [facility]      History of Present Illness:      Elizabeth Chowdhury is a 80 y.o. female who is here today for excision and biopsy of a back mass that has been there for years with a skin ulceration. The has been no changes since I saw the patient last month. She is in today with her daughter    Patient Active Problem List    Diagnosis Date Noted    Lumbar radiculopathy 2017    Neuritis 2017     Past Medical History:   Diagnosis Date    Arthritis     Chronic kidney disease     only has 1/2 left kidney    Diabetes (HCC)     GERD (gastroesophageal reflux disease)     Gout     High cholesterol     Hypertension       Past Surgical History:   Procedure Laterality Date    UROLOGICAL SURGERY      Kidney left       Social History     Tobacco Use    Smoking status: Never    Smokeless tobacco: Never   Substance Use Topics    Alcohol use: No      Social History     Tobacco Use   Smoking Status Never   Smokeless Tobacco Never     No family history on file. Current Outpatient Medications   Medication Sig    sulfamethoxazole-trimethoprim (BACTRIM DS;SEPTRA DS) 800-160 MG per tablet Take 1 tablet by mouth 2 times daily for 7 days    HYDROCHLOROTHIAZIDE PO Take by mouth daily    allopurinol (ZYLOPRIM) 100 MG tablet Take by mouth daily    amLODIPine (NORVASC) 5 MG tablet Take 1 tablet by mouth daily    calcium carb-cholecalciferol 600-10 MG-MCG TABS per tab Take 1 tablet by mouth 2 times daily    Cholecalciferol 50 MCG (2000 UT) TABS Take by mouth    colchicine (COLCRYS) 0.6 MG tablet Take by mouth daily as needed    gabapentin (NEURONTIN) 300 MG capsule Take by mouth 3 times daily.     glimepiride (AMARYL) 2 MG tablet Take by mouth    lisinopril (PRINIVIL;ZESTRIL) 20 MG tablet Take by mouth daily    losartan (COZAAR) 100 MG tablet ceived the following

## 2024-09-13 ENCOUNTER — TELEPHONE (OUTPATIENT)
Facility: CLINIC | Age: 89
End: 2024-09-13

## (undated) DEVICE — MEDIA CONTRAST 10ML 200MG/ML 41%

## (undated) DEVICE — (D)SYR 10ML 1/5ML GRAD NSAF -- PKGING CHANGE USE ITEM 338027

## (undated) DEVICE — (D)BNDG ADHESIVE FABRIC 3/4X3 -- DISC BY MFR USE ITEM 357960

## (undated) DEVICE — (D)NDL SPNE 22GX15CM -- DISC BY MFR W/NO SUB

## (undated) DEVICE — TRAY MYEL SFTY +

## (undated) DEVICE — NDL SPNE MANAN 22GX6IN --